# Patient Record
Sex: FEMALE | Race: WHITE | URBAN - METROPOLITAN AREA
[De-identification: names, ages, dates, MRNs, and addresses within clinical notes are randomized per-mention and may not be internally consistent; named-entity substitution may affect disease eponyms.]

---

## 2016-07-22 LAB
CREATININE, EXTERNAL: 0.9
LDL-C, EXTERNAL: 223

## 2021-06-01 ENCOUNTER — OFFICE VISIT (OUTPATIENT)
Dept: ENDOCRINOLOGY | Age: 81
End: 2021-06-01
Payer: MEDICARE

## 2021-06-01 VITALS
OXYGEN SATURATION: 94 % | DIASTOLIC BLOOD PRESSURE: 71 MMHG | WEIGHT: 189.8 LBS | RESPIRATION RATE: 18 BRPM | HEIGHT: 67 IN | SYSTOLIC BLOOD PRESSURE: 115 MMHG | HEART RATE: 100 BPM | TEMPERATURE: 96.5 F | BODY MASS INDEX: 29.79 KG/M2

## 2021-06-01 DIAGNOSIS — E83.52 HYPERCALCEMIA: Primary | ICD-10-CM

## 2021-06-01 LAB
25(OH)D3 SERPL-MCNC: 23.6 NG/ML (ref 30–100)
ALBUMIN SERPL-MCNC: 4 G/DL (ref 3.5–5)
ALBUMIN/GLOB SERPL: 1.4 {RATIO} (ref 1.1–2.2)
ALP SERPL-CCNC: 109 U/L (ref 45–117)
ALT SERPL-CCNC: 44 U/L (ref 12–78)
ANION GAP SERPL CALC-SCNC: 8 MMOL/L (ref 5–15)
AST SERPL-CCNC: 32 U/L (ref 15–37)
BILIRUB SERPL-MCNC: 0.5 MG/DL (ref 0.2–1)
BUN SERPL-MCNC: 23 MG/DL (ref 6–20)
BUN/CREAT SERPL: 26 (ref 12–20)
CALCIUM SERPL-MCNC: 9.6 MG/DL (ref 8.5–10.1)
CALCIUM SERPL-MCNC: 9.8 MG/DL (ref 8.5–10.1)
CHLORIDE SERPL-SCNC: 105 MMOL/L (ref 97–108)
CO2 SERPL-SCNC: 27 MMOL/L (ref 21–32)
CREAT SERPL-MCNC: 0.89 MG/DL (ref 0.55–1.02)
GLOBULIN SER CALC-MCNC: 2.8 G/DL (ref 2–4)
GLUCOSE SERPL-MCNC: 102 MG/DL (ref 65–100)
POTASSIUM SERPL-SCNC: 3.8 MMOL/L (ref 3.5–5.1)
PROT SERPL-MCNC: 6.8 G/DL (ref 6.4–8.2)
PTH-INTACT SERPL-MCNC: 68.8 PG/ML (ref 18.4–88)
SODIUM SERPL-SCNC: 140 MMOL/L (ref 136–145)

## 2021-06-01 PROCEDURE — G8536 NO DOC ELDER MAL SCRN: HCPCS | Performed by: INTERNAL MEDICINE

## 2021-06-01 PROCEDURE — 1090F PRES/ABSN URINE INCON ASSESS: CPT | Performed by: INTERNAL MEDICINE

## 2021-06-01 PROCEDURE — G8427 DOCREV CUR MEDS BY ELIG CLIN: HCPCS | Performed by: INTERNAL MEDICINE

## 2021-06-01 PROCEDURE — 1101F PT FALLS ASSESS-DOCD LE1/YR: CPT | Performed by: INTERNAL MEDICINE

## 2021-06-01 PROCEDURE — G9717 DOC PT DX DEP/BP F/U NT REQ: HCPCS | Performed by: INTERNAL MEDICINE

## 2021-06-01 PROCEDURE — G8419 CALC BMI OUT NRM PARAM NOF/U: HCPCS | Performed by: INTERNAL MEDICINE

## 2021-06-01 PROCEDURE — G8752 SYS BP LESS 140: HCPCS | Performed by: INTERNAL MEDICINE

## 2021-06-01 PROCEDURE — G8754 DIAS BP LESS 90: HCPCS | Performed by: INTERNAL MEDICINE

## 2021-06-01 PROCEDURE — 99204 OFFICE O/P NEW MOD 45 MIN: CPT | Performed by: INTERNAL MEDICINE

## 2021-06-01 RX ORDER — LANOLIN ALCOHOL/MO/W.PET/CERES
CREAM (GRAM) TOPICAL
Qty: 60 TABLET | Refills: 0
Start: 2021-06-01

## 2021-06-01 NOTE — PATIENT INSTRUCTIONS
SPECIFIC INSTRUCTIONS BELOW 24 hour urine collection instructions for calcium and creat and sodium On the day you plan to collect urine 1. Discard first urine sample soon after you get up 2. Start collecting urine samples in the container then on whole first day . Gerldine Prost ... 3. until the first sample next day is collected into the jar. PAY ATTENTION TO THESE GENERAL INSTRUCTIONS  
 
-ANY tests other than blood work, which you opt to do  outside the  [facility] facilities, you are responsible for prior authorizations if  required 
 
- [address] [facility] AVS- PLEASE IGNORE  
- YOUR MED LIST IS NOT UP TO DATE AS SOME CHANGES ARE BEING MADE AFTER THE VISIT - [address] [place] [address] Results *Normal results will not be notified by a phone call starting January 1 2021 *If you have an upcoming visit, the results will be discussed at the visit *Please sign up for MY CHART if you want access to your lab and test results *Abnormal results which require immediate attention will be notified by phone call *Abnormal results which do not require immediate assistance will be notified in 1-2 weeks Refills    -    have your pharmacy send us a refill request 
Phone calls  -  Allow  24 hrs. for non-urgent calls to be returned Prior authorization - It may take 2-4 weeks to process Forms  -  FMLA, DMV etc., will take up to 2 weeks to process Cancellations - please notify the office 2 days in advance Samples  - will only be dispensed at visits  
 
--------------------------------------------------------------------------------------------

## 2021-06-01 NOTE — LETTER
6/7/2021 Patient: Norvin Gitelman YOB: 1940 Date of Visit: 6/1/2021 Sarina Rizo MD 
[address] [place] [id number] Via Fax: [phone] Dear Sarina Rizo MD, Thank you for referring Ms. Vale Walker to [address] [place] [address] for evaluation. My notes for this consultation are attached. If you have questions, please do not hesitate to call me. I look forward to following your patient along with you. Sincerely, Sebastian Concepcion MD

## 2021-06-01 NOTE — PROGRESS NOTES
Room 1    Identified pt with two pt identifiers(name and ). Reviewed record in preparation for visit and have obtained necessary documentation. All patient medications has been reviewed. Chief Complaint   Patient presents with   [facility] Establish Care    Elevated Blood Calcium       3 most recent PHQ Screens 2021   Little interest or pleasure in doing things Not at all   Feeling down, depressed, irritable, or hopeless Not at all   Total Score PHQ 2 0     Abuse Screening Questionnaire 2021   Do you ever feel afraid of your partner? N   Are you in a relationship with someone who physically or mentally threatens you? N   Is it safe for you to go home? Y       Health Maintenance Review: Patient reminded of \"due or due soon\" health maintenance. I have asked the patient to contact his/her primary care provider (PCP) for follow-up on his/her health maintenance. Vitals:    21 0937   BP: 115/71   Pulse: 100   Resp: 18   Temp: (!) 96.5 °F (35.8 °C)   TempSrc: Oral   SpO2: 94%   Weight: 189 lb 12.8 oz (86.1 kg)   Height: 5' 7.25\" (1.708 m)   PainSc:   0 - No pain         No results found for: HBA1C, UKB0ZYUD, IHG7HSLU, VBN6KNTX    Coordination of Care Questionnaire:   1) Have you been to an emergency room, urgent care, or hospitalized since your last visit?   no       2. Have seen or consulted any other health care provider since your last visit? NO      Patient is accompanied by self I have received verbal consent from Tyson Donahue to discuss any/all medical information while they are present in the room.

## 2021-06-01 NOTE — PROGRESS NOTES
HISTORY OF PRESENT ILLNESS  Norvin Gitelman is a 80 y.o. female. HPI  Initial visit for  High calcium level   Calcium was 11.6  From 2021   She stopped all the calcium and vit  D meds     She has no other specific symptoms   She has fatigue           Past Medical History:   Diagnosis Date    Depression     HTN (hypertension)     Hyperlipidemia     Osteoporosis        Social History     Socioeconomic History    Marital status: SINGLE     Spouse name: Not on file    Number of children: Not on file    Years of education: Not on file    Highest education level: Not on file   Occupational History    Not on file   Tobacco Use    Smoking status: Former Smoker     Packs/day: 1.00     Quit date: 1973     Years since quittin.4    Smokeless tobacco: Never Used   Vaping Use    Vaping Use: Never used   Substance and Sexual Activity    Alcohol use: No    Drug use: Not on file    Sexual activity: Not on file   Other Topics Concern    Not on file   Social History Narrative    Not on file     Social Determinants of Health     Financial Resource Strain:     Difficulty of Paying Living Expenses:    Food Insecurity:     Worried About Running Out of Food in the Last Year:     Ran Out of Food in the Last Year:    Transportation Needs:     Lack of Transportation (Medical):      Lack of Transportation (Non-Medical):    Physical Activity:     Days of Exercise per Week:     Minutes of Exercise per Session:    Stress:     Feeling of Stress :    Social Connections:     Frequency of Communication with Friends and Family:     Frequency of Social Gatherings with Friends and Family:     Attends Mor[employer] Services:     Active Member of Clubs or Organizations:     Attends Club or Organization Meetings:     Marital Status:    Intimate Partner Violence:     Fear of Current or Ex-Partner:     Emotionally Abused:     Physically Abused:     Sexually Abused:        Family History   Problem Relation Age of Onset    Cancer Mother         lung    Alcohol abuse Father     Hypertension Sister        Review of Systems   Constitutional: Positive for malaise/fatigue and weight loss. HENT: Negative. Eyes: Negative for pain and redness. Respiratory: Negative. Cardiovascular: Negative for chest pain, palpitations and leg swelling. Gastrointestinal: Negative. Negative for constipation. Genitourinary: Negative. Musculoskeletal: Negative for myalgias. Skin: Negative. Neurological: Negative. Endo/Heme/Allergies: Negative. Psychiatric/Behavioral: Negative for depression and memory loss. The patient does not have insomnia. Physical Exam  Constitutional:       Appearance: She is well-developed. HENT:      Head: Normocephalic. Eyes:      Conjunctiva/sclera: Conjunctivae normal.      Pupils: Pupils are equal, round, and reactive to light. Neck:      Thyroid: No thyromegaly. Vascular: No JVD. Trachea: No tracheal deviation. Cardiovascular:      Rate and Rhythm: Normal rate and regular rhythm. Heart sounds: Normal heart sounds. No murmur heard. Pulmonary:      Breath sounds: Normal breath sounds. Abdominal:      General: Bowel sounds are normal.      Palpations: Abdomen is soft. Musculoskeletal:         General: Normal range of motion. Cervical back: Normal range of motion and neck supple. Lymphadenopathy:      Cervical: No cervical adenopathy. Skin:     General: Skin is warm. Neurological:      Mental Status: She is alert and oriented to person, place, and time. Deep Tendon Reflexes: Reflexes are normal and symmetric. Reviewed labs from PCP     ASSESSMENT and PLAN    1.  Hypercalcemia :   Patient has moderate range of hypercalcemia     Likely  primary hyperparathyroidism   Will order repeat labs   Will order parathyroid scan, 24 hr urine for calcium, sodium , creatinine  and bone DEXA     As  most patients do not require aggressive intervention unless calcium levels are high,  We opt for watchful observation  In some patients who get localized with parathyroid adenoma and if they satisfy surgical criterion we recommend surgical intervention  Patients can continue on taking low dose calcium supplements despite the hypecalcemia       Reviewed results with patient and discussed the labs being ordered today/bnv  Patient voiced understanding of plan of care

## 2021-06-07 NOTE — PROGRESS NOTES
Inform patient that her calcium levels are back down to normal range. She could be having parathyroid gland abnormality , but subtle .  She can proceed with other testing

## 2021-06-23 DIAGNOSIS — E83.52 HYPERCALCEMIA: ICD-10-CM

## 2021-06-23 LAB
CALCIUM 24H UR-MRATE: 255 MG/24 HR (ref 142–353)
COLLECT DURATION TIME UR: 24 HR
CREAT 24H UR-MRATE: 1302 MG/24HR (ref 600–1800)
SODIUM 24H UR-SRATE: 109 MMOL/24HR (ref 40–220)
SPECIMEN VOL 24H UR: 1450 ML
SPECIMEN VOL 24H UR: 1450 ML
SPECIMEN VOL ?TM UR: 1450 ML

## 2021-08-03 PROBLEM — I10 HTN (HYPERTENSION): Status: RESOLVED | Noted: 2021-08-03 | Resolved: 2021-08-03

## 2021-11-29 ENCOUNTER — OFFICE VISIT (OUTPATIENT)
Dept: OBGYN CLINIC | Age: 81
End: 2021-11-29
Payer: MEDICARE

## 2021-11-29 VITALS
HEIGHT: 66 IN | WEIGHT: 187 LBS | DIASTOLIC BLOOD PRESSURE: 80 MMHG | BODY MASS INDEX: 30.05 KG/M2 | SYSTOLIC BLOOD PRESSURE: 137 MMHG

## 2021-11-29 DIAGNOSIS — Z01.419 ROUTINE GYNECOLOGICAL EXAMINATION: Primary | ICD-10-CM

## 2021-11-29 PROCEDURE — 1101F PT FALLS ASSESS-DOCD LE1/YR: CPT | Performed by: OBSTETRICS & GYNECOLOGY

## 2021-11-29 PROCEDURE — G0101 CA SCREEN;PELVIC/BREAST EXAM: HCPCS | Performed by: OBSTETRICS & GYNECOLOGY

## 2021-11-29 PROCEDURE — G9717 DOC PT DX DEP/BP F/U NT REQ: HCPCS | Performed by: OBSTETRICS & GYNECOLOGY

## 2021-11-29 PROCEDURE — G8417 CALC BMI ABV UP PARAM F/U: HCPCS | Performed by: OBSTETRICS & GYNECOLOGY

## 2021-11-29 PROCEDURE — G8754 DIAS BP LESS 90: HCPCS | Performed by: OBSTETRICS & GYNECOLOGY

## 2021-11-29 PROCEDURE — 1090F PRES/ABSN URINE INCON ASSESS: CPT | Performed by: OBSTETRICS & GYNECOLOGY

## 2021-11-29 PROCEDURE — G8752 SYS BP LESS 140: HCPCS | Performed by: OBSTETRICS & GYNECOLOGY

## 2021-11-29 NOTE — PROGRESS NOTES
HISTORY OF PRESENT ILLNESS  Angela Oneill is a 80 y.o. female who presents today for the following:  Chief Complaint   Patient presents with    Annual Exam   Patient is an 57-year-old female who presents for routine annual exam.  She is without complaints on presentation today. No obstetric history on file. No Known Allergies    Current Outpatient Medications   Medication Sig    calcium citrate-vitamin d3 (Citracal plus D) 315 mg-5 mcg (200 unit) tab Patient held it back for hypercalcemia    fluvoxaMINE (LUVOX) 100 mg tablet Take 100 mg by mouth.  aspirin delayed-release 81 mg tablet Take  by mouth daily.  docusate sodium (STOOL SOFTENER) 100 mg capsule Take 100 mg by mouth daily. (Patient not taking: Reported on 2021)    MULTIVITS-MIN/IRON/FA/LUTEIN (CENTRAL-DAVID WOMEN'S MATURE PO) Take 1 Tab by mouth daily.  hydrochlorothiazide (HYDRODIURIL) 25 mg tablet Take 25 mg by mouth daily.  QUEtiapine (SEROQUEL) 100 mg tablet Take 300 mg by mouth nightly.  losartan (COZAAR) 50 mg tablet Take 40 mg by mouth daily. No current facility-administered medications for this visit.        Past Medical History:   Diagnosis Date    Depression     HTN (hypertension)     Hyperlipidemia     Osteoporosis        Past Surgical History:   Procedure Laterality Date    HX BLADDER REPAIR      bladder tuck    HX HYSTERECTOMY      HX OTHER SURGICAL      right wrist    HX TONSILLECTOMY         Family History   Problem Relation Age of Onset    Cancer Mother         lung    Alcohol abuse Father     Hypertension Sister        Social History     Socioeconomic History    Marital status: SINGLE     Spouse name: Not on file    Number of children: Not on file    Years of education: Not on file    Highest education level: Not on file   Occupational History    Not on file   Tobacco Use    Smoking status: Former Smoker     Packs/day: 1.00     Quit date: 1973     Years since quittin.9    Smokeless tobacco: Never Used   Vaping Use    Vaping Use: Never used   Substance and Sexual Activity    Alcohol use: No    Drug use: Never    Sexual activity: Not Currently   Other Topics Concern    Not on file   Social History Narrative    Not on file     Social Determinants of Health     Financial Resource Strain:     Difficulty of Paying Living Expenses: Not on file   Food Insecurity:     Worried About Running Out of Food in the Last Year: Not on file    Lucio of Food in the Last Year: Not on file   Transportation Needs:     Lack of Transportation (Medical): Not on file    Lack of Transportation (Non-Medical): Not on file   Physical Activity:     Days of Exercise per Week: Not on file    Minutes of Exercise per Session: Not on file   Stress:     Feeling of Stress : Not on file   Social Connections:     Frequency of Communication with Friends and Family: Not on file    Frequency of Social Gatherings with Friends and Family: Not on file    Attends Scientology Services: Not on file    Active Member of [address] [place] [address] or Organizations: Not on file    Attends Club or Organization Meetings: Not on file    Marital Status: Not on file   Intimate Partner Violence:     Fear of Current or Ex-Partner: Not on file    Emotionally Abused: Not on file    Physically Abused: Not on file    Sexually Abused: Not on file   Housing Stability:     Unable to Pay for Housing in the Last Year: Not on file    Number of [place] in the Last Year: Not on file    Unstable Housing in the Last Year: Not on file           REVIEW OF SYSTEMS     Constitutional: Negative for chills, fever and malaise/fatigue. HENT: Negative for congestion, hearing loss and sore throat. Respiratory: Negative for cough, sputum production, shortness of breath and wheezing. Cardiovascular: Negative for chest pain. Gastrointestinal: Negative for abdominal pain, constipation, diarrhea, nausea and vomiting.    Genitourinary: Negative for dysuria, flank pain, hematuria and urgency. Neurological: Negative for dizziness, loss of consciousness, weakness and headaches. Psychiatric/Behavioral: Negative for depression. PHYSICAL EXAM  /80 (BP 1 Location: Right upper arm, BP Patient Position: Sitting, BP Cuff Size: Small adult)   Ht 5' 6\" (1.676 m)   Wt 187 lb (84.8 kg)   BMI 30.18 kg/m²      Patient is a well-developed well-nourished female no apparent distress  She is alert and oriented x3  Head is normocephalic atraumatic pupils equal round react light accommodation  Neck is supple without adenopathy or thyromegaly  Heart is with regular rate and rhythm without murmurs rubs or gallops  Lungs are clear to auscultation and percussion bilaterally  Breasts are without masses bilaterally  Abdomen is soft nontender nondistended bowel sounds are present and active  Extremities are without clubbing cyanosis or edema  Pulses are full and symmetric bilaterally  Pelvic  External genitalia within normal limits  Urethra is midline there are no apparent urethral lesions the bladder is within normal limits  Vagina is with normal rugae there is minimal discharge present in the vaginal vault  Cervix is surgically absent  Uterus is surgically absent  Adnexa are without masses    ASSESSMENT and PLAN  Normal annual exam  Plan: Mammogram ordered, follow-up as needed and yearly  No results found for this visit on 11/29/21. No orders of the defined types were placed in this encounter.

## 2022-10-14 ENCOUNTER — OFFICE VISIT (OUTPATIENT)
Dept: OBGYN CLINIC | Age: 82
End: 2022-10-14
Payer: MEDICARE

## 2022-10-14 VITALS — SYSTOLIC BLOOD PRESSURE: 116 MMHG | DIASTOLIC BLOOD PRESSURE: 76 MMHG | BODY MASS INDEX: 30.67 KG/M2 | WEIGHT: 190 LBS

## 2022-10-14 DIAGNOSIS — N76.0 VAGINITIS AND VULVOVAGINITIS: Primary | ICD-10-CM

## 2022-10-14 DIAGNOSIS — B37.31 MONILIAL VULVOVAGINITIS: ICD-10-CM

## 2022-10-14 PROCEDURE — G8417 CALC BMI ABV UP PARAM F/U: HCPCS | Performed by: OBSTETRICS & GYNECOLOGY

## 2022-10-14 PROCEDURE — 1090F PRES/ABSN URINE INCON ASSESS: CPT | Performed by: OBSTETRICS & GYNECOLOGY

## 2022-10-14 PROCEDURE — G8536 NO DOC ELDER MAL SCRN: HCPCS | Performed by: OBSTETRICS & GYNECOLOGY

## 2022-10-14 PROCEDURE — 99212 OFFICE O/P EST SF 10 MIN: CPT | Performed by: OBSTETRICS & GYNECOLOGY

## 2022-10-14 PROCEDURE — G9717 DOC PT DX DEP/BP F/U NT REQ: HCPCS | Performed by: OBSTETRICS & GYNECOLOGY

## 2022-10-14 PROCEDURE — G8427 DOCREV CUR MEDS BY ELIG CLIN: HCPCS | Performed by: OBSTETRICS & GYNECOLOGY

## 2022-10-14 PROCEDURE — 1101F PT FALLS ASSESS-DOCD LE1/YR: CPT | Performed by: OBSTETRICS & GYNECOLOGY

## 2022-10-14 PROCEDURE — G8754 DIAS BP LESS 90: HCPCS | Performed by: OBSTETRICS & GYNECOLOGY

## 2022-10-14 PROCEDURE — G8752 SYS BP LESS 140: HCPCS | Performed by: OBSTETRICS & GYNECOLOGY

## 2022-10-14 PROCEDURE — 1123F ACP DISCUSS/DSCN MKR DOCD: CPT | Performed by: OBSTETRICS & GYNECOLOGY

## 2022-10-14 RX ORDER — FLUCONAZOLE 150 MG/1
150 TABLET ORAL DAILY
Qty: 1 TABLET | Refills: 0 | Status: SHIPPED | OUTPATIENT
Start: 2022-10-14 | End: 2022-10-15

## 2022-10-14 NOTE — PROGRESS NOTES
Rhonda Weiner is a 80 y.o. female who presents today for the following:  Chief Complaint   Patient presents with    Vaginal Itching     Pt. States she has some vaginal burning and itching          HPI  Patient is an 51-year-old G3, P4 female who presents today with complaints of vaginal burning and irritation. OB History          3    Para   3    Term   3            AB        Living   3         SAB        IAB        Ectopic        Molar        Multiple        Live Births   3                   /76 (BP 1 Location: Right upper arm, BP Patient Position: Sitting, BP Cuff Size: Small adult)   Wt 190 lb (86.2 kg)   BMI 30.67 kg/m²    OBGyn Exam   Patient is a well-developed well-nourished female no apparent distress  She is alert and oriented x3  Pelvic  External genitalia within normal limits  Urethra is midline there are no apparent urethral lesions the bladder is within normal limits  Vagina is with normal rugae though atrophic, there is some white discharge present in the vaginal vault  No results found for this visit on 10/14/22. Assessment:  Probable yeast vaginitis  Plan: Fluconazole Rx sent, follow-up as needed and yearly    No orders of the defined types were placed in this encounter.

## 2022-10-16 LAB
A VAGINAE DNA VAG QL NAA+PROBE: NORMAL SCORE
BVAB2 DNA VAG QL NAA+PROBE: NORMAL SCORE
C ALBICANS DNA VAG QL NAA+PROBE: NEGATIVE
C GLABRATA DNA VAG QL NAA+PROBE: NEGATIVE
C KRUSEI DNA VAG QL NAA+PROBE: NEGATIVE
C LUSITANIAE DNA VAG QL NAA+PROBE: NEGATIVE
C TRACH DNA VAG QL NAA+PROBE: NEGATIVE
CANDIDA DNA VAG QL NAA+PROBE: NEGATIVE
MEGA1 DNA VAG QL NAA+PROBE: NORMAL SCORE
N GONORRHOEA DNA VAG QL NAA+PROBE: NEGATIVE
T VAGINALIS DNA VAG QL NAA+PROBE: NEGATIVE

## 2023-03-01 ENCOUNTER — TRANSCRIBE ORDER (OUTPATIENT)
Dept: SCHEDULING | Age: 83
End: 2023-03-01

## 2023-03-01 DIAGNOSIS — S79.921A INJURY OF RIGHT THIGH, INITIAL ENCOUNTER: Primary | ICD-10-CM

## 2023-03-13 ENCOUNTER — HOSPITAL ENCOUNTER (OUTPATIENT)
Dept: MRI IMAGING | Age: 83
Discharge: HOME | End: 2023-03-13

## 2023-03-13 ENCOUNTER — TRANSCRIBE ORDER (OUTPATIENT)
Dept: SCHEDULING | Age: 83
End: 2023-03-13

## 2023-03-13 DIAGNOSIS — S69.91XA INJURY OF RIGHT THUMB, INITIAL ENCOUNTER: Primary | ICD-10-CM

## 2023-03-13 DIAGNOSIS — S69.91XA INJURY OF RIGHT THUMB, INITIAL ENCOUNTER: ICD-10-CM

## 2023-03-31 ENCOUNTER — HOSPITAL ENCOUNTER (OUTPATIENT)
Dept: MRI IMAGING | Age: 83
Discharge: HOME | End: 2023-03-31
Payer: MEDICARE

## 2023-03-31 PROCEDURE — 73218 MRI UPPER EXTREMITY W/O DYE: CPT

## 2023-04-22 DIAGNOSIS — S79.921A INJURY OF RIGHT THIGH, INITIAL ENCOUNTER: Primary | ICD-10-CM

## 2023-06-06 ENCOUNTER — APPOINTMENT (OUTPATIENT)
Facility: HOSPITAL | Age: 83
DRG: 195 | End: 2023-06-06
Payer: MEDICARE

## 2023-06-06 ENCOUNTER — HOSPITAL ENCOUNTER (INPATIENT)
Facility: HOSPITAL | Age: 83
LOS: 2 days | Discharge: HOME | DRG: 195 | End: 2023-06-08
Attending: EMERGENCY MEDICINE | Admitting: INTERNAL MEDICINE
Payer: MEDICARE

## 2023-06-06 DIAGNOSIS — R07.9 ACUTE CHEST PAIN: ICD-10-CM

## 2023-06-06 DIAGNOSIS — R06.09 DOE (DYSPNEA ON EXERTION): Primary | ICD-10-CM

## 2023-06-06 LAB
ALBUMIN SERPL-MCNC: 3.6 G/DL (ref 3.5–5)
ALBUMIN/GLOB SERPL: 1 (ref 1.1–2.2)
ALP SERPL-CCNC: 89 U/L (ref 45–117)
ALT SERPL-CCNC: 36 U/L (ref 12–78)
ANION GAP SERPL CALC-SCNC: 6 MMOL/L (ref 5–15)
AST SERPL W P-5'-P-CCNC: 26 U/L (ref 15–37)
BASOPHILS # BLD: 0.1 K/UL (ref 0–0.1)
BASOPHILS NFR BLD: 1 % (ref 0–1)
BILIRUB SERPL-MCNC: 0.5 MG/DL (ref 0.2–1)
BNP SERPL-MCNC: 165 PG/ML
BUN SERPL-MCNC: 21 MG/DL (ref 6–20)
BUN/CREAT SERPL: 23 (ref 12–20)
CA-I BLD-MCNC: 9.7 MG/DL (ref 8.5–10.1)
CHLORIDE SERPL-SCNC: 103 MMOL/L (ref 97–108)
CO2 SERPL-SCNC: 30 MMOL/L (ref 21–32)
CREAT SERPL-MCNC: 0.92 MG/DL (ref 0.55–1.02)
DIFFERENTIAL METHOD BLD: NORMAL
EKG ATRIAL RATE: 68 BPM
EKG DIAGNOSIS: NORMAL
EKG P AXIS: 3 DEGREES
EKG P-R INTERVAL: 182 MS
EKG Q-T INTERVAL: 450 MS
EKG QRS DURATION: 158 MS
EKG QTC CALCULATION (BAZETT): 478 MS
EKG R AXIS: -45 DEGREES
EKG T AXIS: 26 DEGREES
EKG VENTRICULAR RATE: 68 BPM
EOSINOPHIL # BLD: 0.1 K/UL (ref 0–0.4)
EOSINOPHIL NFR BLD: 2 % (ref 0–7)
ERYTHROCYTE [DISTWIDTH] IN BLOOD BY AUTOMATED COUNT: 13 % (ref 11.5–14.5)
GLOBULIN SER CALC-MCNC: 3.5 G/DL (ref 2–4)
GLUCOSE SERPL-MCNC: 97 MG/DL (ref 65–100)
HCT VFR BLD AUTO: 39.6 % (ref 35–47)
HGB BLD-MCNC: 13 G/DL (ref 11.5–16)
IMM GRANULOCYTES # BLD AUTO: 0 K/UL (ref 0–0.04)
IMM GRANULOCYTES NFR BLD AUTO: 0 % (ref 0–0.5)
LYMPHOCYTES # BLD: 1.2 K/UL (ref 0.8–3.5)
LYMPHOCYTES NFR BLD: 20 % (ref 12–49)
MAGNESIUM SERPL-MCNC: 2.2 MG/DL (ref 1.6–2.4)
MCH RBC QN AUTO: 30.8 PG (ref 26–34)
MCHC RBC AUTO-ENTMCNC: 32.8 G/DL (ref 30–36.5)
MCV RBC AUTO: 93.8 FL (ref 80–99)
MONOCYTES # BLD: 0.5 K/UL (ref 0–1)
MONOCYTES NFR BLD: 8 % (ref 5–13)
NEUTS SEG # BLD: 4.1 K/UL (ref 1.8–8)
NEUTS SEG NFR BLD: 69 % (ref 32–75)
NRBC # BLD: 0 K/UL (ref 0–0.01)
NRBC BLD-RTO: 0 PER 100 WBC
PLATELET # BLD AUTO: 257 K/UL (ref 150–400)
PMV BLD AUTO: 9.9 FL (ref 8.9–12.9)
POTASSIUM SERPL-SCNC: 3.1 MMOL/L (ref 3.5–5.1)
PROT SERPL-MCNC: 7.1 G/DL (ref 6.4–8.2)
RBC # BLD AUTO: 4.22 M/UL (ref 3.8–5.2)
SODIUM SERPL-SCNC: 139 MMOL/L (ref 136–145)
TROPONIN I SERPL HS-MCNC: 13 NG/L (ref 0–51)
WBC # BLD AUTO: 6 K/UL (ref 3.6–11)

## 2023-06-06 PROCEDURE — 1100000000 HC RM PRIVATE

## 2023-06-06 PROCEDURE — 84484 ASSAY OF TROPONIN QUANT: CPT

## 2023-06-06 PROCEDURE — 99285 EMERGENCY DEPT VISIT HI MDM: CPT

## 2023-06-06 PROCEDURE — 36415 COLL VENOUS BLD VENIPUNCTURE: CPT

## 2023-06-06 PROCEDURE — 71045 X-RAY EXAM CHEST 1 VIEW: CPT

## 2023-06-06 PROCEDURE — 93005 ELECTROCARDIOGRAM TRACING: CPT | Performed by: EMERGENCY MEDICINE

## 2023-06-06 PROCEDURE — 80053 COMPREHEN METABOLIC PANEL: CPT

## 2023-06-06 PROCEDURE — 83880 ASSAY OF NATRIURETIC PEPTIDE: CPT

## 2023-06-06 PROCEDURE — 85025 COMPLETE CBC W/AUTO DIFF WBC: CPT

## 2023-06-06 PROCEDURE — 94762 N-INVAS EAR/PLS OXIMTRY CONT: CPT

## 2023-06-06 PROCEDURE — 83735 ASSAY OF MAGNESIUM: CPT

## 2023-06-06 RX ORDER — QUETIAPINE FUMARATE 300 MG/1
300 TABLET, FILM COATED ORAL NIGHTLY
COMMUNITY
Start: 2023-05-15

## 2023-06-06 ASSESSMENT — HEART SCORE: ECG: 1

## 2023-06-06 ASSESSMENT — PAIN - FUNCTIONAL ASSESSMENT: PAIN_FUNCTIONAL_ASSESSMENT: NONE - DENIES PAIN

## 2023-06-06 ASSESSMENT — LIFESTYLE VARIABLES
HOW MANY STANDARD DRINKS CONTAINING ALCOHOL DO YOU HAVE ON A TYPICAL DAY: PATIENT DOES NOT DRINK
HOW OFTEN DO YOU HAVE A DRINK CONTAINING ALCOHOL: NEVER

## 2023-06-06 NOTE — ED NOTES
Pt placed on continuous cardiac monitoring, pulse ox, and blood pressure monitoring at this time.         Chi Kasper RN  06/06/23 8147

## 2023-06-06 NOTE — ED PROVIDER NOTES
Metabolic Panel    Collection Time: 06/06/23  5:00 PM   Result Value Ref Range    Sodium 139 136 - 145 mmol/L    Potassium 3.1 (L) 3.5 - 5.1 mmol/L    Chloride 103 97 - 108 mmol/L    CO2 30 21 - 32 mmol/L    Anion Gap 6 5 - 15 mmol/L    Glucose 97 65 - 100 mg/dL    BUN 21 (H) 6 - 20 mg/dL    Creatinine 0.92 0.55 - 1.02 mg/dL    Bun/Cre Ratio 23 (H) 12 - 20      Est, Glom Filt Rate >60 >60 ml/min/1.73m2    Calcium 9.7 8.5 - 10.1 mg/dL    Total Bilirubin 0.5 0.2 - 1.0 mg/dL    AST 26 15 - 37 U/L    ALT 36 12 - 78 U/L    Alk Phosphatase 89 45 - 117 U/L    Total Protein 7.1 6.4 - 8.2 g/dL    Albumin 3.6 3.5 - 5.0 g/dL    Globulin 3.5 2.0 - 4.0 g/dL    Albumin/Globulin Ratio 1.0 (L) 1.1 - 2.2     Magnesium    Collection Time: 06/06/23  5:00 PM   Result Value Ref Range    Magnesium 2.2 1.6 - 2.4 mg/dL   Troponin    Collection Time: 06/06/23  5:00 PM   Result Value Ref Range    Troponin, High Sensitivity 13 0 - 51 ng/L   Brain Natriuretic Peptide    Collection Time: 06/06/23  5:00 PM   Result Value Ref Range    NT Pro- <450 pg/mL       EKG: Initial EKG interpreted by me. Normal Sinus Rhythm with a HR of 68. Normal UT, normal QRS, normal QTc. Bifascicular block. LVH. Jessica Zabrina No STEMI. Radiologic Studies:  Non-plain film images such as CT, Ultrasound and MRI are read by the radiologist. Plain radiographic images are visualized and preliminarily interpreted by the ED Provider with the following findings: Not Applicable. Interpretation per the Radiologist below, if available at the time of this note:  XR CHEST PORTABLE   Final Result      Possible retrocardiac airspace opacities in the left lung base which may   represent pneumonia.               EMERGENCY DEPARTMENT COURSE and DIFFERENTIAL DIAGNOSIS/MDM   CC/HPI Summary, DDx, ED Course, and Reassessment:     Clinical Management Tools:  Not Applicable    Records Reviewed (source and summary of external notes): Prior medical records and Nursing notes    Vitals:

## 2023-06-06 NOTE — ED TRIAGE NOTES
Pt arrives via EMS c/o SOB. She was at her PCP being seen for SOB that she has had for \"a while\" that worsens with exertion. Her PCP did an EKG and she had EKG changes that her PCP believed were indicative of MI. Pt denies CP upon arrival to ED. A&O x4, clear speech, follows commands, NAD.

## 2023-06-07 ENCOUNTER — APPOINTMENT (OUTPATIENT)
Facility: HOSPITAL | Age: 83
DRG: 195 | End: 2023-06-07
Attending: INTERNAL MEDICINE
Payer: MEDICARE

## 2023-06-07 PROBLEM — I49.9 CARDIAC ARRHYTHMIA: Status: ACTIVE | Noted: 2023-06-07

## 2023-06-07 LAB
ANION GAP SERPL CALC-SCNC: 5 MMOL/L (ref 5–15)
BUN SERPL-MCNC: 24 MG/DL (ref 6–20)
BUN/CREAT SERPL: 26 (ref 12–20)
CA-I BLD-MCNC: 9.3 MG/DL (ref 8.5–10.1)
CHLORIDE SERPL-SCNC: 103 MMOL/L (ref 97–108)
CO2 SERPL-SCNC: 30 MMOL/L (ref 21–32)
CREAT SERPL-MCNC: 0.92 MG/DL (ref 0.55–1.02)
ECHO AO ASC DIAM: 3.2 CM
ECHO AO ASCENDING AORTA INDEX: 1.68 CM/M2
ECHO AO ROOT DIAM: 3.2 CM
ECHO AO ROOT INDEX: 1.68 CM/M2
ECHO AV AREA PEAK VELOCITY: 2 CM2
ECHO AV AREA VTI: 2.2 CM2
ECHO AV AREA/BSA PEAK VELOCITY: 1 CM2/M2
ECHO AV AREA/BSA VTI: 1.2 CM2/M2
ECHO AV MEAN GRADIENT: 9 MMHG
ECHO AV MEAN VELOCITY: 1.4 M/S
ECHO AV PEAK GRADIENT: 16 MMHG
ECHO AV PEAK VELOCITY: 2 M/S
ECHO AV VELOCITY RATIO: 0.65
ECHO AV VTI: 42 CM
ECHO BSA: 1.94 M2
ECHO LA AREA 2C: 17.1 CM2
ECHO LA AREA 4C: 20.4 CM2
ECHO LA DIAMETER INDEX: 1.88 CM/M2
ECHO LA DIAMETER: 3.6 CM
ECHO LA MAJOR AXIS: 5.4 CM
ECHO LA MINOR AXIS: 5.3 CM
ECHO LA TO AORTIC ROOT RATIO: 1.13
ECHO LA VOL 2C: 45 ML (ref 22–52)
ECHO LA VOL 4C: 63 ML (ref 22–52)
ECHO LA VOL BP: 53 ML (ref 22–52)
ECHO LA VOL/BSA BIPLANE: 28 ML/M2 (ref 16–34)
ECHO LA VOLUME INDEX A2C: 24 ML/M2 (ref 16–34)
ECHO LA VOLUME INDEX A4C: 33 ML/M2 (ref 16–34)
ECHO LV E' LATERAL VELOCITY: 7 CM/S
ECHO LV E' SEPTAL VELOCITY: 5 CM/S
ECHO LV EDV A2C: 86 ML
ECHO LV EDV A4C: 98 ML
ECHO LV EDV INDEX A4C: 51 ML/M2
ECHO LV EDV NDEX A2C: 45 ML/M2
ECHO LV EJECTION FRACTION A2C: 61 %
ECHO LV EJECTION FRACTION A4C: 58 %
ECHO LV EJECTION FRACTION BIPLANE: 57 % (ref 55–100)
ECHO LV ESV A2C: 34 ML
ECHO LV ESV A4C: 41 ML
ECHO LV ESV INDEX A2C: 18 ML/M2
ECHO LV ESV INDEX A4C: 21 ML/M2
ECHO LV FRACTIONAL SHORTENING: 32 % (ref 28–44)
ECHO LV INTERNAL DIMENSION DIASTOLE INDEX: 2.15 CM/M2
ECHO LV INTERNAL DIMENSION DIASTOLIC: 4.1 CM (ref 3.9–5.3)
ECHO LV INTERNAL DIMENSION SYSTOLIC INDEX: 1.47 CM/M2
ECHO LV INTERNAL DIMENSION SYSTOLIC: 2.8 CM
ECHO LV IVSD: 1.5 CM (ref 0.6–0.9)
ECHO LV MASS 2D: 204.9 G (ref 67–162)
ECHO LV MASS INDEX 2D: 107.3 G/M2 (ref 43–95)
ECHO LV POSTERIOR WALL DIASTOLIC: 1.2 CM (ref 0.6–0.9)
ECHO LV RELATIVE WALL THICKNESS RATIO: 0.59
ECHO LVOT AREA: 3.1 CM2
ECHO LVOT AV VTI INDEX: 0.7
ECHO LVOT DIAM: 2 CM
ECHO LVOT MEAN GRADIENT: 4 MMHG
ECHO LVOT PEAK GRADIENT: 7 MMHG
ECHO LVOT PEAK VELOCITY: 1.3 M/S
ECHO LVOT STROKE VOLUME INDEX: 48 ML/M2
ECHO LVOT SV: 91.7 ML
ECHO LVOT VTI: 29.2 CM
ECHO MV A VELOCITY: 1.19 M/S
ECHO MV E DECELERATION TIME (DT): 331 MS
ECHO MV E VELOCITY: 0.61 M/S
ECHO MV E/A RATIO: 0.51
ECHO MV E/E' LATERAL: 8.71
ECHO MV E/E' RATIO (AVERAGED): 10.46
ECHO MV E/E' SEPTAL: 12.2
ECHO PV MAX VELOCITY: 0.7 M/S
ECHO PV PEAK GRADIENT: 2 MMHG
ECHO RA AREA 4C: 13.2 CM2
ECHO RA END SYSTOLIC VOLUME APICAL 4 CHAMBER INDEX BSA: 15 ML/M2
ECHO RA VOLUME: 28 ML
ECHO RV BASAL DIMENSION: 3.8 CM
ECHO RV TAPSE: 1.8 CM (ref 1.7–?)
ECHO TV REGURGITANT MAX VELOCITY: 1.85 M/S
ECHO TV REGURGITANT PEAK GRADIENT: 14 MMHG
EKG ATRIAL RATE: 63 BPM
EKG DIAGNOSIS: NORMAL
EKG P AXIS: 48 DEGREES
EKG P-R INTERVAL: 174 MS
EKG Q-T INTERVAL: 408 MS
EKG QRS DURATION: 74 MS
EKG QTC CALCULATION (BAZETT): 417 MS
EKG R AXIS: -31 DEGREES
EKG T AXIS: 18 DEGREES
EKG VENTRICULAR RATE: 63 BPM
GLUCOSE SERPL-MCNC: 126 MG/DL (ref 65–100)
POTASSIUM SERPL-SCNC: 3.3 MMOL/L (ref 3.5–5.1)
SODIUM SERPL-SCNC: 138 MMOL/L (ref 136–145)
TROPONIN I SERPL HS-MCNC: 11 NG/L (ref 0–51)
TROPONIN I SERPL HS-MCNC: 11 NG/L (ref 0–51)
TROPONIN I SERPL HS-MCNC: 13 NG/L (ref 0–51)
TROPONIN I SERPL HS-MCNC: 13 NG/L (ref 0–51)
TROPONIN I SERPL HS-MCNC: 14 NG/L (ref 0–51)
TROPONIN I SERPL HS-MCNC: 15 NG/L (ref 0–51)
TROPONIN I SERPL HS-MCNC: 18 NG/L (ref 0–51)

## 2023-06-07 PROCEDURE — 80048 BASIC METABOLIC PNL TOTAL CA: CPT

## 2023-06-07 PROCEDURE — 2580000003 HC RX 258: Performed by: INTERNAL MEDICINE

## 2023-06-07 PROCEDURE — 2060000000 HC ICU INTERMEDIATE R&B

## 2023-06-07 PROCEDURE — 6370000000 HC RX 637 (ALT 250 FOR IP): Performed by: INTERNAL MEDICINE

## 2023-06-07 PROCEDURE — 94761 N-INVAS EAR/PLS OXIMETRY MLT: CPT

## 2023-06-07 PROCEDURE — 2700000000 HC OXYGEN THERAPY PER DAY

## 2023-06-07 PROCEDURE — 93306 TTE W/DOPPLER COMPLETE: CPT

## 2023-06-07 PROCEDURE — 6360000002 HC RX W HCPCS: Performed by: INTERNAL MEDICINE

## 2023-06-07 PROCEDURE — 36415 COLL VENOUS BLD VENIPUNCTURE: CPT

## 2023-06-07 PROCEDURE — 84484 ASSAY OF TROPONIN QUANT: CPT

## 2023-06-07 RX ORDER — POTASSIUM CHLORIDE 7.45 MG/ML
10 INJECTION INTRAVENOUS PRN
Status: DISCONTINUED | OUTPATIENT
Start: 2023-06-07 | End: 2023-06-08 | Stop reason: HOSPADM

## 2023-06-07 RX ORDER — ACETAMINOPHEN 650 MG/1
650 SUPPOSITORY RECTAL EVERY 6 HOURS PRN
Status: DISCONTINUED | OUTPATIENT
Start: 2023-06-07 | End: 2023-06-08 | Stop reason: HOSPADM

## 2023-06-07 RX ORDER — ONDANSETRON 4 MG/1
4 TABLET, ORALLY DISINTEGRATING ORAL EVERY 8 HOURS PRN
Status: DISCONTINUED | OUTPATIENT
Start: 2023-06-07 | End: 2023-06-08 | Stop reason: HOSPADM

## 2023-06-07 RX ORDER — METOPROLOL TARTRATE 50 MG/1
50 TABLET, FILM COATED ORAL 2 TIMES DAILY
Status: DISCONTINUED | OUTPATIENT
Start: 2023-06-07 | End: 2023-06-08 | Stop reason: HOSPADM

## 2023-06-07 RX ORDER — ASPIRIN 81 MG/1
81 TABLET, CHEWABLE ORAL DAILY
Status: DISCONTINUED | OUTPATIENT
Start: 2023-06-07 | End: 2023-06-07

## 2023-06-07 RX ORDER — QUETIAPINE FUMARATE 100 MG/1
300 TABLET, FILM COATED ORAL NIGHTLY
Status: DISCONTINUED | OUTPATIENT
Start: 2023-06-07 | End: 2023-06-08 | Stop reason: HOSPADM

## 2023-06-07 RX ORDER — ASPIRIN 81 MG/1
81 TABLET, CHEWABLE ORAL DAILY
Status: DISCONTINUED | OUTPATIENT
Start: 2023-06-07 | End: 2023-06-08 | Stop reason: HOSPADM

## 2023-06-07 RX ORDER — ENOXAPARIN SODIUM 100 MG/ML
40 INJECTION SUBCUTANEOUS DAILY
Status: DISCONTINUED | OUTPATIENT
Start: 2023-06-07 | End: 2023-06-08 | Stop reason: HOSPADM

## 2023-06-07 RX ORDER — SODIUM CHLORIDE 0.9 % (FLUSH) 0.9 %
5-40 SYRINGE (ML) INJECTION EVERY 12 HOURS SCHEDULED
Status: DISCONTINUED | OUTPATIENT
Start: 2023-06-07 | End: 2023-06-08 | Stop reason: HOSPADM

## 2023-06-07 RX ORDER — ACETAMINOPHEN 325 MG/1
650 TABLET ORAL EVERY 6 HOURS PRN
Status: DISCONTINUED | OUTPATIENT
Start: 2023-06-07 | End: 2023-06-08 | Stop reason: HOSPADM

## 2023-06-07 RX ORDER — FLUVOXAMINE MALEATE 50 MG/1
100 TABLET, COATED ORAL NIGHTLY
Status: DISCONTINUED | OUTPATIENT
Start: 2023-06-07 | End: 2023-06-08 | Stop reason: HOSPADM

## 2023-06-07 RX ORDER — POTASSIUM CHLORIDE 20 MEQ/1
40 TABLET, EXTENDED RELEASE ORAL PRN
Status: DISCONTINUED | OUTPATIENT
Start: 2023-06-07 | End: 2023-06-08 | Stop reason: HOSPADM

## 2023-06-07 RX ORDER — HYDROCHLOROTHIAZIDE 25 MG/1
25 TABLET ORAL DAILY
Status: DISCONTINUED | OUTPATIENT
Start: 2023-06-07 | End: 2023-06-08 | Stop reason: HOSPADM

## 2023-06-07 RX ORDER — LOSARTAN POTASSIUM 50 MG/1
50 TABLET ORAL DAILY
Status: DISCONTINUED | OUTPATIENT
Start: 2023-06-07 | End: 2023-06-08 | Stop reason: HOSPADM

## 2023-06-07 RX ORDER — SODIUM CHLORIDE 0.9 % (FLUSH) 0.9 %
5-40 SYRINGE (ML) INJECTION PRN
Status: DISCONTINUED | OUTPATIENT
Start: 2023-06-07 | End: 2023-06-08 | Stop reason: HOSPADM

## 2023-06-07 RX ORDER — SODIUM CHLORIDE 9 MG/ML
INJECTION, SOLUTION INTRAVENOUS PRN
Status: DISCONTINUED | OUTPATIENT
Start: 2023-06-07 | End: 2023-06-08 | Stop reason: HOSPADM

## 2023-06-07 RX ORDER — POLYETHYLENE GLYCOL 3350 17 G/17G
17 POWDER, FOR SOLUTION ORAL DAILY PRN
Status: DISCONTINUED | OUTPATIENT
Start: 2023-06-07 | End: 2023-06-08 | Stop reason: HOSPADM

## 2023-06-07 RX ORDER — ONDANSETRON 2 MG/ML
4 INJECTION INTRAMUSCULAR; INTRAVENOUS EVERY 6 HOURS PRN
Status: DISCONTINUED | OUTPATIENT
Start: 2023-06-07 | End: 2023-06-08 | Stop reason: HOSPADM

## 2023-06-07 RX ORDER — POTASSIUM CHLORIDE 1.5 G/1.77G
40 POWDER, FOR SOLUTION ORAL PRN
Status: DISCONTINUED | OUTPATIENT
Start: 2023-06-07 | End: 2023-06-08 | Stop reason: HOSPADM

## 2023-06-07 RX ADMIN — SODIUM CHLORIDE, PRESERVATIVE FREE 10 ML: 5 INJECTION INTRAVENOUS at 09:10

## 2023-06-07 RX ADMIN — HYDROCHLOROTHIAZIDE 25 MG: 25 TABLET ORAL at 09:11

## 2023-06-07 RX ADMIN — FLUVOXAMINE MALEATE 100 MG: 50 TABLET, COATED ORAL at 01:44

## 2023-06-07 RX ADMIN — QUETIAPINE FUMARATE 300 MG: 300 TABLET ORAL at 01:44

## 2023-06-07 RX ADMIN — CEFTRIAXONE SODIUM 1000 MG: 1 INJECTION, POWDER, FOR SOLUTION INTRAMUSCULAR; INTRAVENOUS at 09:08

## 2023-06-07 RX ADMIN — AZITHROMYCIN MONOHYDRATE 500 MG: 500 INJECTION, POWDER, LYOPHILIZED, FOR SOLUTION INTRAVENOUS at 09:10

## 2023-06-07 RX ADMIN — ENOXAPARIN SODIUM 40 MG: 100 INJECTION SUBCUTANEOUS at 09:08

## 2023-06-07 RX ADMIN — METOPROLOL TARTRATE 50 MG: 50 TABLET, FILM COATED ORAL at 21:17

## 2023-06-07 RX ADMIN — LOSARTAN POTASSIUM 50 MG: 50 TABLET, FILM COATED ORAL at 00:25

## 2023-06-07 RX ADMIN — FLUVOXAMINE MALEATE 100 MG: 50 TABLET, COATED ORAL at 21:16

## 2023-06-07 RX ADMIN — QUETIAPINE FUMARATE 300 MG: 300 TABLET ORAL at 21:17

## 2023-06-07 RX ADMIN — ASPIRIN 81 MG 81 MG: 81 TABLET ORAL at 09:12

## 2023-06-07 RX ADMIN — METOPROLOL TARTRATE 50 MG: 50 TABLET, FILM COATED ORAL at 00:25

## 2023-06-07 RX ADMIN — SODIUM CHLORIDE, PRESERVATIVE FREE 10 ML: 5 INJECTION INTRAVENOUS at 21:18

## 2023-06-07 NOTE — PLAN OF CARE
Problem: Discharge Planning  Goal: Discharge to home or other facility with appropriate resources  6/7/2023 1227 by Liane Vang RN  Outcome: Progressing  Flowsheets (Taken 6/7/2023 0446 by Juhi Sales RN)  Discharge to home or other facility with appropriate resources: Identify barriers to discharge with patient and caregiver  6/7/2023 0416 by Juhi Sales RN  Outcome: Progressing  Flowsheets (Taken 6/7/2023 0404)  Discharge to home or other facility with appropriate resources: Identify discharge learning needs (meds, wound care, etc)     Problem: Skin/Tissue Integrity  Goal: Absence of new skin breakdown  Description: 1. Monitor for areas of redness and/or skin breakdown  2. Assess vascular access sites hourly  3. Every 4-6 hours minimum:  Change oxygen saturation probe site  4. Every 4-6 hours:  If on nasal continuous positive airway pressure, respiratory therapy assess nares and determine need for appliance change or resting period.   6/7/2023 1227 by Liane Vang RN  Outcome: Progressing  6/7/2023 0416 by Juhi Sales RN  Outcome: Progressing     Problem: ABCDS Injury Assessment  Goal: Absence of physical injury  6/7/2023 1227 by Liane Vang RN  Outcome: Progressing  6/7/2023 0416 by Juhi Sales RN  Outcome: Progressing     Problem: Safety - Adult  Goal: Free from fall injury  Outcome: Progressing

## 2023-06-07 NOTE — ED NOTES
Pt's daughter Shirley Calloway took all belongings with her home (clothes and phone). Daughter stated to please call her for any updates and if pt goes upstairs. If she doesn't answer please leave a voicemail.      Fiorella Vega, HERMAN  66/69/[phone]

## 2023-06-07 NOTE — H&P
HISTORY AND PHYSICAL             Date: 6/7/2023        Patient Name: Artemio Chavez     YOB: 1940      Age:  80 y.o. Chief Complaint     Chief Complaint   Patient presents with    Shortness of Breath          History Obtained From   patient, electronic medical record    History of Present Illness   Patient was directed to the hospital because of abnormal EKG of new bundle branch block and shortness of breath. Patient is a poor historian has a history of depression osteoporosis and hypertension a chest x-ray shows possible retrocardiac pneumonia she denies any cough no fever no chills    Past Medical History     Past Medical History:   Diagnosis Date    Depression     HTN (hypertension)     Hyperlipidemia     Osteoporosis         Past Surgical History     Past Surgical History:   Procedure Laterality Date    BLADDER REPAIR      bladder tuck    HYSTERECTOMY (CERVIX STATUS UNKNOWN)      OTHER SURGICAL HISTORY      right wrist    TONSILLECTOMY          Medications Prior to Admission     Prior to Admission medications    Medication Sig Start Date End Date Taking? Authorizing Provider   QUEtiapine (SEROQUEL) 300 MG tablet Take 1 tablet by mouth nightly at bedtime.  5/15/23  Yes Historical Provider, MD   aspirin 81 MG EC tablet Take by mouth daily    Ar Automatic Reconciliation   calcium citrate-vitamin D (CITRACAL+D) 315-5 MG-MCG TABS per tablet Patient held it back for hypercalcemia 6/1/21   Ar Automatic Reconciliation   docusate (COLACE, DULCOLAX) 100 MG CAPS Take 100 mg by mouth daily    Ar Automatic Reconciliation   fluvoxaMINE (LUVOX) 100 MG tablet Take 1 tablet by mouth 9/17/16   Ar Automatic Reconciliation   hydroCHLOROthiazide (HYDRODIURIL) 25 MG tablet Take 1 tablet by mouth daily    Ar Automatic Reconciliation   losartan (COZAAR) 50 MG tablet Take 40 mg by mouth daily    Ar Automatic Reconciliation   QUEtiapine (SEROQUEL) 100 MG tablet Take 300 mg by mouth nightly  Patient not taking:

## 2023-06-07 NOTE — PROGRESS NOTES
Family and patient would like patient to obtain ECHO to rule out anything. Dr. Espinoza Almendarez ordered ECHO       [phone] [id number]: Patient obtained ECHO.

## 2023-06-07 NOTE — PROGRESS NOTES
Consult received. Bedside swallow screen completed. Patient denies difficulty swallowing. RN reports tolerating present diet. No significant dysphagia noted at this time. Please re consult if swallow worsens or concerns for aspiration. No further swallow evaluation to follow.

## 2023-06-07 NOTE — CARE COORDINATION
06/07/23 1135   Service Assessment   Patient Orientation Alert and Oriented   Cognition Alert   History Provided By Patient   Primary [address] is: Legal Next of Kin   PCP Verified by CM Yes   Last Visit to PCP Within last 3 months   Prior Functional Level Independent in ADLs/IADLs   Current Functional Level Independent in ADLs/IADLs   Can patient return to prior living arrangement Yes   Ability to make needs known: Good   Family able to assist with home care needs: Yes   Would you like for me to discuss the discharge plan with any other family members/significant others, and if so, who? Yes  (daughter - Lilly Arvizu)   Financial Resources [employer] None   Social/Functional History   Lives With Alone   Type of Home Trailer   ADL Assistance Independent   Active  Yes   Mode of Transportation Car   Occupation Retired   Discharge Planning   Type of Residence Trailer/Mobile [address] [place] [address] Prior To Admission None   Type of [address] None   Patient expects to be discharged to: [facility]mobile home   [facility]   Services At/After Discharge None   Confirm Follow Up Transport Self     CM met with the patient at the bedside. Patient lives alone in a trailer. She is independent and cares for herself. She drives. Denies using any DME. Intends to discharge home self-care with no needs.

## 2023-06-07 NOTE — CONSULTS
3.1*      CO2 30   BUN 21*   BUNCRER 23*   MG 2.2   BILITOT 0.5   ALKPHOS 89   GLOB 3.5   AST 26   ALT 36          troponins 11, 13, 15, 18, 14    Prior to Admission medications    Medication Sig Start Date End Date Taking? Authorizing Provider   QUEtiapine (SEROQUEL) 300 MG tablet Take 1 tablet by mouth nightly at bedtime.  5/15/23  Yes Historical Provider, MD   aspirin 81 MG EC tablet Take by mouth daily    Ar Automatic Reconciliation   calcium citrate-vitamin D (CITRACAL+D) 315-5 MG-MCG TABS per tablet Patient held it back for hypercalcemia 6/1/21   Ar Automatic Reconciliation   docusate (COLACE, DULCOLAX) 100 MG CAPS Take 100 mg by mouth daily    Ar Automatic Reconciliation   fluvoxaMINE (LUVOX) 100 MG tablet Take 1 tablet by mouth 9/17/16   Ar Automatic Reconciliation   hydroCHLOROthiazide (HYDRODIURIL) 25 MG tablet Take 1 tablet by mouth daily    Ar Automatic Reconciliation   losartan (COZAAR) 50 MG tablet Take 40 mg by mouth daily    Ar Automatic Reconciliation   QUEtiapine (SEROQUEL) 100 MG tablet Take 300 mg by mouth nightly  Patient not taking: Reported on 6/7/2023    Ar Reagan Sacks, MD

## 2023-06-07 NOTE — PLAN OF CARE
Problem: Discharge Planning  Goal: Discharge to home or other facility with appropriate resources  Outcome: Progressing  Flowsheets (Taken 6/7/2023 0404)  Discharge to home or other facility with appropriate resources: Identify discharge learning needs (meds, wound care, etc)     Problem: Skin/Tissue Integrity  Goal: Absence of new skin breakdown  Description: 1. Monitor for areas of redness and/or skin breakdown  2. Assess vascular access sites hourly  3. Every 4-6 hours minimum:  Change oxygen saturation probe site  4. Every 4-6 hours:  If on nasal continuous positive airway pressure, respiratory therapy assess nares and determine need for appliance change or resting period.   Outcome: Progressing     Problem: ABCDS Injury Assessment  Goal: Absence of physical injury  Outcome: Progressing

## 2023-06-07 NOTE — ED NOTES
Dr Lili Jones called for patient BP being 192/95, verbal orders given to resume home meds, troponin q3 hrs x 2, lopressor 50 mg PO BID, and a cardiac diet.       Oneal Guadarrama RN  06/07/23 6589

## 2023-06-07 NOTE — ED NOTES
Message left for daughter for update on patient being transported upstairs to [address] [place] [address]  69/63/19 7184

## 2023-06-07 NOTE — PROGRESS NOTES
1700: Patient potassium is 3.1 from 6/6/23. No labs orders are in place today. CMP are scheduled for tomorrow. Dr. Yenni Gray ordered BMP STAT to monitor potassium.

## 2023-06-08 VITALS
BODY MASS INDEX: 27.47 KG/M2 | DIASTOLIC BLOOD PRESSURE: 73 MMHG | RESPIRATION RATE: 16 BRPM | WEIGHT: 175 LBS | SYSTOLIC BLOOD PRESSURE: 137 MMHG | TEMPERATURE: 98.1 F | HEIGHT: 67 IN | OXYGEN SATURATION: 92 % | HEART RATE: 53 BPM

## 2023-06-08 LAB
CHOLEST SERPL-MCNC: 304 MG/DL
ERYTHROCYTE [DISTWIDTH] IN BLOOD BY AUTOMATED COUNT: 13 % (ref 11.5–14.5)
HCT VFR BLD AUTO: 38.2 % (ref 35–47)
HDLC SERPL-MCNC: 47 MG/DL
HDLC SERPL: 6.5 (ref 0–5)
HGB BLD-MCNC: 12.4 G/DL (ref 11.5–16)
LDLC SERPL CALC-MCNC: 209 MG/DL (ref 0–100)
LIPID PANEL: ABNORMAL
MCH RBC QN AUTO: 30.8 PG (ref 26–34)
MCHC RBC AUTO-ENTMCNC: 32.5 G/DL (ref 30–36.5)
MCV RBC AUTO: 95 FL (ref 80–99)
NRBC # BLD: 0 K/UL (ref 0–0.01)
NRBC BLD-RTO: 0 PER 100 WBC
PLATELET # BLD AUTO: 236 K/UL (ref 150–400)
PMV BLD AUTO: 10.2 FL (ref 8.9–12.9)
RBC # BLD AUTO: 4.02 M/UL (ref 3.8–5.2)
TRIGL SERPL-MCNC: 240 MG/DL
TROPONIN I SERPL HS-MCNC: 12 NG/L (ref 0–51)
VLDLC SERPL CALC-MCNC: 48 MG/DL
WBC # BLD AUTO: 5 K/UL (ref 3.6–11)

## 2023-06-08 PROCEDURE — 84484 ASSAY OF TROPONIN QUANT: CPT

## 2023-06-08 PROCEDURE — 85027 COMPLETE CBC AUTOMATED: CPT

## 2023-06-08 PROCEDURE — 6360000002 HC RX W HCPCS: Performed by: INTERNAL MEDICINE

## 2023-06-08 PROCEDURE — 2580000003 HC RX 258: Performed by: INTERNAL MEDICINE

## 2023-06-08 PROCEDURE — 6370000000 HC RX 637 (ALT 250 FOR IP): Performed by: INTERNAL MEDICINE

## 2023-06-08 PROCEDURE — 80061 LIPID PANEL: CPT

## 2023-06-08 PROCEDURE — 36415 COLL VENOUS BLD VENIPUNCTURE: CPT

## 2023-06-08 RX ORDER — QUETIAPINE FUMARATE 300 MG/1
300 TABLET, FILM COATED ORAL NIGHTLY
Qty: 60 TABLET | Refills: 3 | Status: SHIPPED | OUTPATIENT
Start: 2023-06-08

## 2023-06-08 RX ORDER — METOPROLOL TARTRATE 50 MG/1
50 TABLET, FILM COATED ORAL 2 TIMES DAILY
Qty: 60 TABLET | Refills: 3 | Status: SHIPPED | OUTPATIENT
Start: 2023-06-08

## 2023-06-08 RX ORDER — POTASSIUM CHLORIDE 1.5 G/1.77G
20 POWDER, FOR SOLUTION ORAL DAILY
Status: DISCONTINUED | OUTPATIENT
Start: 2023-06-08 | End: 2023-06-08 | Stop reason: HOSPADM

## 2023-06-08 RX ORDER — CEFUROXIME AXETIL 500 MG/1
500 TABLET ORAL EVERY 12 HOURS SCHEDULED
Qty: 20 TABLET | Refills: 0 | Status: SHIPPED | OUTPATIENT
Start: 2023-06-08 | End: 2023-06-18

## 2023-06-08 RX ORDER — CEFUROXIME AXETIL 250 MG/1
500 TABLET ORAL EVERY 12 HOURS SCHEDULED
Status: DISCONTINUED | OUTPATIENT
Start: 2023-06-08 | End: 2023-06-08 | Stop reason: HOSPADM

## 2023-06-08 RX ORDER — POTASSIUM CHLORIDE 1.5 G/1.77G
20 POWDER, FOR SOLUTION ORAL DAILY
Qty: 30 EACH | Refills: 3 | Status: SHIPPED | OUTPATIENT
Start: 2023-06-08

## 2023-06-08 RX ORDER — AZITHROMYCIN 500 MG/1
250 TABLET, FILM COATED ORAL DAILY
Status: DISCONTINUED | OUTPATIENT
Start: 2023-06-09 | End: 2023-06-08 | Stop reason: HOSPADM

## 2023-06-08 RX ORDER — AZITHROMYCIN 500 MG/1
500 TABLET, FILM COATED ORAL ONCE
Status: COMPLETED | OUTPATIENT
Start: 2023-06-08 | End: 2023-06-08

## 2023-06-08 RX ORDER — AZITHROMYCIN 250 MG/1
250 TABLET, FILM COATED ORAL DAILY
Qty: 4 TABLET | Refills: 0 | Status: SHIPPED | OUTPATIENT
Start: 2023-06-09 | End: 2023-06-13

## 2023-06-08 RX ORDER — POTASSIUM CHLORIDE 1.5 G/1.77G
40 POWDER, FOR SOLUTION ORAL ONCE
Status: DISCONTINUED | OUTPATIENT
Start: 2023-06-08 | End: 2023-06-08 | Stop reason: HOSPADM

## 2023-06-08 RX ORDER — POLYETHYLENE GLYCOL 3350 17 G/17G
17 POWDER, FOR SOLUTION ORAL DAILY PRN
Qty: 100 G | Refills: 0 | Status: SHIPPED | OUTPATIENT
Start: 2023-06-08 | End: 2023-07-08

## 2023-06-08 RX ADMIN — AZITHROMYCIN MONOHYDRATE 500 MG: 500 TABLET ORAL at 10:08

## 2023-06-08 RX ADMIN — CEFUROXIME AXETIL 500 MG: 250 TABLET, FILM COATED ORAL at 10:08

## 2023-06-08 RX ADMIN — ENOXAPARIN SODIUM 40 MG: 100 INJECTION SUBCUTANEOUS at 10:10

## 2023-06-08 RX ADMIN — POTASSIUM CHLORIDE 20 MEQ: 1.5 FOR SOLUTION ORAL at 10:09

## 2023-06-08 RX ADMIN — METOPROLOL TARTRATE 50 MG: 50 TABLET, FILM COATED ORAL at 10:08

## 2023-06-08 RX ADMIN — SODIUM CHLORIDE, PRESERVATIVE FREE 10 ML: 5 INJECTION INTRAVENOUS at 10:09

## 2023-06-08 RX ADMIN — HYDROCHLOROTHIAZIDE 25 MG: 25 TABLET ORAL at 10:08

## 2023-06-08 RX ADMIN — LOSARTAN POTASSIUM 50 MG: 50 TABLET, FILM COATED ORAL at 10:08

## 2023-06-08 RX ADMIN — POTASSIUM CHLORIDE 40 MEQ: 1500 TABLET, EXTENDED RELEASE ORAL at 05:52

## 2023-06-08 RX ADMIN — ASPIRIN 81 MG 81 MG: 81 TABLET ORAL at 10:08

## 2023-06-08 NOTE — PROGRESS NOTES
Discharge nurse reviewed DC instructions with patient and family at bedside. No further questions. Patient taken downstairs by wheelchair. No signs or symptoms of distress noted.

## 2023-06-08 NOTE — CARE COORDINATION
4841: Discharge order home noted. Summary pending. When transportation available, patient to discharge home per order. Discharge Checklist Completed. Transition of Care Plan:    RUR: 12%  Prior Level of Functioning: Independent  Disposition: Home  If SNF or IPR: Date FOC offered: N/A  Date FOC received: N/A  Accepting facility: N/A  Date authorization started with reference number: N/A  Date authorization received and expires: N/A  Follow up appointments: Discharge Summary  DME needed: None  Transportation at discharge: Family  IM/IMM Medicare/ letter given: 06/06/2023  Is patient a  and connected with [facility]? No  If yes, was [employer] transfer form completed and VA notified? N/A  Caregiver Contact: No  Discharge Caregiver contacted prior to discharge? No  Care Conference needed?  No  Barriers to discharge: None

## 2023-06-08 NOTE — DISCHARGE INSTR - COC
Continuity of Care Form    Patient Name: Kb Guaman   :  1940  MRN:  [id number]    [facility] date:  2023  Discharge date:  2023    Code Status Order: Full Code   Advance Directives:     Admitting Physician:  Brittany Guzman MD  PCP: Rancho Escamilla MD    Discharging Nurse: Tiny Hernandez RN  [address] Unit/Room#: 225/01  Discharging Unit Phone Number: [phone]    Emergency Contact:   Extended Emergency Contact Information  Primary Emergency Contact: Joyce Robin Phone: [phone]  Relation: Child    Past Surgical History:  Past Surgical History:   Procedure Laterality Date    BLADDER REPAIR      bladder tuck    HYSTERECTOMY (CERVIX STATUS UNKNOWN)      OTHER SURGICAL HISTORY      right wrist    TONSILLECTOMY         Immunization History: There is no immunization history on file for this patient. Active Problems:  Patient Active Problem List   Diagnosis Code    Essential hypertension I10    Hyperlipidemia E78.5    Osteoporosis M81.0    Depression F32. A    GEORGE (dyspnea on exertion) R06.09    Cardiac arrhythmia I49.9       Isolation/Infection:   Isolation            No Isolation          Patient Infection Status       None to display            Nurse Assessment:  Last Vital Signs: /73   Pulse 53   Temp 98.1 °F (36.7 °C) (Oral)   Resp 16   Ht 1.702 m (5' 7\")   Wt 79.4 kg (175 lb)   SpO2 92%   BMI 27.41 kg/m²     Last documented pain score (0-10 scale):    Last Weight:   Wt Readings from Last 1 Encounters:   23 79.4 kg (175 lb)     Mental Status:  oriented    IV Access:  - None    Nursing Mobility/ADLs:  Walking   Independent  Transfer  Independent  Bathing  Independent  Dressing  Independent  Toileting  Independent  Feeding  Independent  Med Admin  Independent  Med Delivery   whole    Wound Care Documentation and Therapy:        Elimination:  Continence:    Bowel: Yes  Bladder: Yes  Urinary Catheter: None   Colostomy/Ileostomy/Ileal Conduit: No

## 2023-06-24 NOTE — DISCHARGE SUMMARY
Discharge Summary     Patient: Deidra Moore MRN: [id number]  SSN: [id number]    YOB: 1940  Age: 80 y.o. Sex: female       Admit Date: 6/6/2023    Discharge Date: 6/24/2023      Admission Diagnoses: GEORGE (dyspnea on exertion) [R06.09]  Acute chest pain [R07.9]  Cardiac arrhythmia [I49.9]    Discharge Diagnoses:      Discharge Condition: Good    Hospital Course: Patient presented patient presented with shortness of breath admitted for pneumonia possible cardiac arrhythmia cognitive disorder depression patient was admitted presented to echocardiogram was obtained cardilogy consult was obtainedPatient patient was seen by Dr. Johnson Nevarez recommendation was for outpatient stress test    Consults: Cardiology    Significant Diagnostic Studies: labs:     XR CHEST PORTABLE   Final Result      Possible retrocardiac airspace opacities in the left lung base which may   represent pneumonia. Disposition: home    Discharge Medications:   Discharge Medication List as of 6/8/2023 10:33 AM        START taking these medications    Details   azithromycin (ZITHROMAX) 250 MG tablet Take 1 tablet by mouth daily for 4 doses, Disp-4 tablet, R-0Normal      polyethylene glycol (GLYCOLAX) 17 g packet Take 17 g by mouth daily as needed for Constipation, Disp-100 g, R-0Normal      metoprolol tartrate (LOPRESSOR) 50 MG tablet Take 1 tablet by mouth 2 times daily, Disp-60 tablet, R-3Normal      cefUROXime (CEFTIN) 500 MG tablet Take 1 tablet by mouth every 12 hours for 10 days, Disp-20 tablet, R-0Normal      potassium chloride (KLOR-CON) 20 MEQ packet Take 20 mEq by mouth daily, Disp-30 each, R-3Normal           CONTINUE these medications which have CHANGED    Details   !! QUEtiapine (SEROQUEL) 300 MG tablet Take 1 tablet by mouth nightly, Disp-60 tablet, R-3Normal       !! - Potential duplicate medications found. Please discuss with provider.         CONTINUE these medications which have NOT CHANGED    Details   !!

## 2024-07-05 ENCOUNTER — HOSPITAL ENCOUNTER (EMERGENCY)
Facility: HOSPITAL | Age: 84
Discharge: HOME | End: 2024-07-05
Attending: STUDENT IN AN ORGANIZED HEALTH CARE EDUCATION/TRAINING PROGRAM
Payer: MEDICARE

## 2024-07-05 VITALS
BODY MASS INDEX: 28.25 KG/M2 | TEMPERATURE: 98.3 F | SYSTOLIC BLOOD PRESSURE: 167 MMHG | RESPIRATION RATE: 16 BRPM | DIASTOLIC BLOOD PRESSURE: 110 MMHG | OXYGEN SATURATION: 95 % | WEIGHT: 180 LBS | HEIGHT: 67 IN | HEART RATE: 91 BPM

## 2024-07-05 DIAGNOSIS — R03.0 ELEVATED BLOOD PRESSURE READING: ICD-10-CM

## 2024-07-05 DIAGNOSIS — S61.213A LACERATION OF LEFT MIDDLE FINGER WITHOUT FOREIGN BODY WITHOUT DAMAGE TO NAIL, INITIAL ENCOUNTER: Primary | ICD-10-CM

## 2024-07-05 PROCEDURE — 90471 IMMUNIZATION ADMIN: CPT | Performed by: NURSE PRACTITIONER

## 2024-07-05 PROCEDURE — 90714 TD VACC NO PRESV 7 YRS+ IM: CPT | Performed by: NURSE PRACTITIONER

## 2024-07-05 PROCEDURE — 99284 EMERGENCY DEPT VISIT MOD MDM: CPT

## 2024-07-05 PROCEDURE — 6370000000 HC RX 637 (ALT 250 FOR IP): Performed by: NURSE PRACTITIONER

## 2024-07-05 PROCEDURE — 6360000002 HC RX W HCPCS: Performed by: NURSE PRACTITIONER

## 2024-07-05 RX ORDER — GINSENG 100 MG
CAPSULE ORAL
Status: COMPLETED | OUTPATIENT
Start: 2024-07-05 | End: 2024-07-05

## 2024-07-05 RX ADMIN — BACITRACIN 1 EACH: 500 OINTMENT TOPICAL at 17:09

## 2024-07-05 RX ADMIN — CLOSTRIDIUM TETANI TOXOID ANTIGEN (FORMALDEHYDE INACTIVATED) AND CORYNEBACTERIUM DIPHTHERIAE TOXOID ANTIGEN (FORMALDEHYDE INACTIVATED) 0.5 ML: 5; 2 INJECTION, SUSPENSION INTRAMUSCULAR at 17:09

## 2024-07-05 ASSESSMENT — PAIN SCALES - GENERAL: PAINLEVEL_OUTOF10: 3

## 2024-07-05 ASSESSMENT — PAIN DESCRIPTION - LOCATION: LOCATION: FINGER (COMMENT WHICH ONE)

## 2024-07-05 ASSESSMENT — PAIN - FUNCTIONAL ASSESSMENT: PAIN_FUNCTIONAL_ASSESSMENT: 0-10

## 2024-07-05 ASSESSMENT — PAIN DESCRIPTION - ORIENTATION: ORIENTATION: RIGHT

## 2024-07-05 ASSESSMENT — PAIN DESCRIPTION - PAIN TYPE: TYPE: ACUTE PAIN

## 2024-07-05 NOTE — ED TRIAGE NOTES
Patient arrives reports laceration to right 3 third digit while attempting to look in air conditioner. Reports bleeding made her concerned enough to come to ED. Bleeding controlled in triage. Denies being on blood thinners.

## 2024-07-05 NOTE — DISCHARGE INSTRUCTIONS
Keep the cut dry for the first 24 to 48 hours. After this, you can shower if your doctor okays it. Pat the cut dry.  Don't soak the cut, such as in a bathtub. Your doctor will tell you when it's safe to get the cut wet.  If your doctor told you how to care for your cut, follow your doctor's instructions. If you did not get instructions, follow this general advice:  Do not put any kind of ointment, cream, or lotion over the area. This can make the adhesive fall off too soon.  After the first 24 to 48 hours, wash around the cut with clean water 2 times a day. Do not use hydrogen peroxide or alcohol, which can slow healing.  If the doctor told you to use a bandage, put on a new bandage after cleaning the cut or if the bandage gets wet or dirty.  Prop up the sore area on a pillow anytime you sit or lie down during the next 3 days. Try to keep it above the level of your heart. This will help reduce swelling.  Leave the skin adhesive on your skin until it falls off on its own. This may take 5 to 10 days.  Do not scratch, rub, or pick at the adhesive.  Do not put the sticky part of a bandage directly on the adhesive.  Avoid any activity that could cause your cut to reopen.  Be safe with medicines. Read and follow all instructions on the label.  If the doctor gave you a prescription medicine for pain, take it as prescribed.  If you are not taking a prescription pain medicine, ask your doctor if you can take an over-the-counter medicine.

## 2024-07-05 NOTE — ED PROVIDER NOTES
[facility] EMERGENCY DEPT  EMERGENCY DEPARTMENT ENCOUNTER      Pt Name: Yaneli Corrales  MRN: [id number]  Birthdate 1940  Date of evaluation: 7/5/2024  Provider: ROBERTO Ling NP    CHIEF COMPLAINT       Chief Complaint   Patient presents with    Laceration     Right 3rd digit         HISTORY OF PRESENT ILLNESS   (Location/Symptom, Timing/Onset, Context/Setting, Quality, Duration, Modifying Factors, Severity)  Note limiting factors.   The history is provided by the patient. No  was used.       Yaneli Corrales is a 84 y.o. female with Hx of complex cardiac arrhythmia, hyperlipidemia, osteoporosis, depression who presents ambulatory to [facility] ED with cc of finger injury.     Pt was trying to remove metal air filter and cut tip of 3rd finger RIGHT hand PTA. Last TD unknown.  Has sensation to her finger without difficulty and no changes to range of motion.  Does not take anticoagulation.    Denies any other medical concerns.  Denies tobacco, alcohol, substance abuse.          PCP: Rei Gonzalez MD    There are no other complaints, changes or physical findings at this time.        Review of External Medical Records:     Nursing Notes were reviewed.    REVIEW OF SYSTEMS    (2-9 systems for level 4, 10 or more for level 5)     Review of Systems   Musculoskeletal:  Positive for arthralgias.   Skin:  Positive for wound.       Except as noted above the remainder of the review of systems was reviewed and negative.       PAST MEDICAL HISTORY     Past Medical History:   Diagnosis Date    Depression     HTN (hypertension)     Hyperlipidemia     Osteoporosis          SURGICAL HISTORY       Past Surgical History:   Procedure Laterality Date    BLADDER REPAIR      bladder tuck    HYSTERECTOMY (CERVIX STATUS UNKNOWN)      OTHER SURGICAL HISTORY      right wrist    TONSILLECTOMY           CURRENT MEDICATIONS       Discharge Medication List as of 7/5/2024  5:09 PM        CONTINUE these    Height: 1.702 m (5' 7\")           Medical Decision Making  Patient arrives to the emergency department with small laceration, has a well aligned skin flap, to the third finger on her left hand.  There is no evidence of uncontrolled bleeding.  Patient is very anxious on arrival, which is likely why she has a more elevated blood pressure reading.  Nursing staff performed wound care at bedside, based off presence of wound there is no indication for any further intervention with wound repair.  Discussed outpatient wound care management with patient prior to discharge, tetanus shot updated.    Risk  OTC drugs.  Prescription drug management.            REASSESSMENT            CONSULTS:  None    PROCEDURES:  Unless otherwise noted below, none     Procedures      FINAL IMPRESSION      1. Laceration of left middle finger without foreign body without damage to nail, initial encounter    2. Elevated blood pressure reading          DISPOSITION/PLAN   DISPOSITION Decision To Discharge 07/05/2024 05:21:15 PM      PATIENT REFERRED TO:  Rei Gonzalez MD  [address] [facility] [place] [address]  [phone]    Schedule an appointment as soon as possible for a visit       [facility] [place] [facility] EMERGENCY DEPT  [address]  [facility] [address] [phone]  Go to   As needed, If symptoms worsen      DISCHARGE MEDICATIONS:  Discharge Medication List as of 7/5/2024  5:09 PM            (Please note that portions of this note were completed with a voice recognition program.  Efforts were made to edit the dictations but occasionally words are mis-transcribed.)    ROBERTO Ling NP (electronically signed)  Emergency Attending Physician / Physician Assistant / Nurse Practitioner             Reena Vega APRN - NP  07/05/24 5544

## 2024-08-01 ENCOUNTER — HOSPITAL ENCOUNTER (EMERGENCY)
Facility: HOSPITAL | Age: 84
Discharge: HOME | End: 2024-08-01
Attending: STUDENT IN AN ORGANIZED HEALTH CARE EDUCATION/TRAINING PROGRAM
Payer: MEDICARE

## 2024-08-01 VITALS
RESPIRATION RATE: 16 BRPM | TEMPERATURE: 97.8 F | OXYGEN SATURATION: 95 % | DIASTOLIC BLOOD PRESSURE: 67 MMHG | HEART RATE: 73 BPM | SYSTOLIC BLOOD PRESSURE: 137 MMHG

## 2024-08-01 DIAGNOSIS — S61.215A LACERATION OF LEFT RING FINGER WITHOUT FOREIGN BODY WITHOUT DAMAGE TO NAIL, INITIAL ENCOUNTER: Primary | ICD-10-CM

## 2024-08-01 PROCEDURE — 99283 EMERGENCY DEPT VISIT LOW MDM: CPT

## 2024-08-01 RX ORDER — CEPHALEXIN 500 MG/1
500 CAPSULE ORAL 3 TIMES DAILY
Qty: 15 CAPSULE | Refills: 0 | Status: SHIPPED | OUTPATIENT
Start: 2024-08-01 | End: 2024-08-06

## 2024-08-01 ASSESSMENT — PAIN SCALES - GENERAL: PAINLEVEL_OUTOF10: 2

## 2024-08-01 ASSESSMENT — PAIN - FUNCTIONAL ASSESSMENT: PAIN_FUNCTIONAL_ASSESSMENT: 0-10

## 2024-08-01 NOTE — DISCHARGE INSTRUCTIONS
Keep the laceration clean and dry.  Apply dressing if there is any oozing of blood, or for protection.  Monitor for signs of infection, including swelling, pain, redness, or any kind of drainage, especially, if it appears like pus, or if you have fever.

## 2024-08-01 NOTE — ED PROVIDER NOTES
[facility] [place] [place]  EMERGENCY DEPARTMENT ENCOUNTER      Pt Name: Yaneli Corrales  MRN: [id number]  Birthdate 1940  Date of evaluation: 8/1/2024  Provider: Mireille Littlejohn PA-C    CHIEF COMPLAINT       Chief Complaint   Patient presents with    Laceration         HISTORY OF PRESENT ILLNESS   (Location/Symptom, Timing/Onset, Context/Setting, Quality, Duration, Modifying Factors, Severity)  Note limiting factors.   The patient is an 84-year-old female presents emergency department with a laceration to the left ring finger, distal tip, caused by a knife while she was using it to cut food yesterday evening, approximately 6 or 6:30 PM.  The patient states that the knife was very sharp, it is new, and this is why laceration occurred.    She wrapped the digit with some gauze, she applied some Coban, the bleeding was stopped, she opened it this morning, and felt she needed to have it assessed.    The patient states her tetanus immunization is up-to-date, she was here approximately 1 month ago when she had a laceration from changing the filter in her air intake grate.     She denies numbness to the fingertip, there is some throbbing, and pain, but she has full range of motion.    The history is provided by the patient.         Review of External Medical Records:     Nursing Notes were reviewed.    REVIEW OF SYSTEMS    (2-9 systems for level 4, 10 or more for level 5)     Review of Systems    Except as noted above the remainder of the review of systems was reviewed and negative.       PAST MEDICAL HISTORY     Past Medical History:   Diagnosis Date    Depression     HTN (hypertension)     Hyperlipidemia     Osteoporosis          SURGICAL HISTORY       Past Surgical History:   Procedure Laterality Date    BLADDER REPAIR      bladder tuck    HYSTERECTOMY (CERVIX STATUS UNKNOWN)      OTHER SURGICAL HISTORY      right wrist    TONSILLECTOMY           CURRENT MEDICATIONS       Discharge Medication List as of 8/1/2024   would have been recommended.  The patient's tetanus immunization was confirmed up to date, related to an injury she sustained approximately a month ago.  She is placed on cephalexin for infection prophylaxis.    She was given wound care instructions, advised to monitor the laceration, and if she has any concerns for infection, including warmth, tenderness, drainage, or increased pain, she should be reevaluated.  The patient voiced understanding.    Risk  Prescription drug management.            REASSESSMENT            CONSULTS:  None    PROCEDURES:  Unless otherwise noted below, none     Procedures      FINAL IMPRESSION      1. Laceration of left ring finger without foreign body without damage to nail, initial encounter          DISPOSITION/PLAN   DISPOSITION Decision To Discharge 08/01/2024 02:16:36 PM      PATIENT REFERRED TO:  Rei Gonzalez MD  [address] [facility] [address]  [phone]      As needed    [facility] [place]  [address]  [facility] [address]  [phone]    If symptoms worsen      DISCHARGE MEDICATIONS:  Discharge Medication List as of 8/1/2024  2:16 PM        START taking these medications    Details   cephALEXin (KEFLEX) 500 MG capsule Take 1 capsule by mouth 3 times daily for 5 days, Disp-15 capsule, R-0Normal               (Please note that portions of this note were completed with a voice recognition program.  Efforts were made to edit the dictations but occasionally words are mis-transcribed.)    Mireille Littlejohn PA-C (electronically signed)  Emergency Attending Physician / Physician Assistant / Nurse Practitioner             Mireille Littlejohn PA-C  08/04/24 5111

## 2024-08-01 NOTE — ED TRIAGE NOTES
Patient ambulatory to ED c/o laceration to L 4th digit last night. Got a new set of kitchen knives and accidentally cut herself. Clean knife, UTD on tetanus. Denies blood thinners but says the finger still bleeds.

## 2024-08-29 ENCOUNTER — OFFICE VISIT (OUTPATIENT)
Age: 84
End: 2024-08-29
Payer: MEDICARE

## 2024-08-29 VITALS — WEIGHT: 183 LBS | BODY MASS INDEX: 28.72 KG/M2 | HEIGHT: 67 IN

## 2024-08-29 DIAGNOSIS — Z01.419 GYNECOLOGIC EXAM NORMAL: ICD-10-CM

## 2024-08-29 DIAGNOSIS — Z12.72 SCREENING FOR VAGINAL CANCER: ICD-10-CM

## 2024-08-29 DIAGNOSIS — N95.1 MENOPAUSAL SYNDROME: ICD-10-CM

## 2024-08-29 DIAGNOSIS — Z12.31 SCREENING MAMMOGRAM FOR BREAST CANCER: Primary | ICD-10-CM

## 2024-08-29 DIAGNOSIS — N64.4 MASTODYNIA: ICD-10-CM

## 2024-08-29 DIAGNOSIS — Z90.710 HISTORY OF HYSTERECTOMY: ICD-10-CM

## 2024-08-29 DIAGNOSIS — N63.10 MASS OF RIGHT BREAST, UNSPECIFIED QUADRANT: ICD-10-CM

## 2024-08-29 PROCEDURE — G8419 CALC BMI OUT NRM PARAM NOF/U: HCPCS | Performed by: OBSTETRICS & GYNECOLOGY

## 2024-08-29 PROCEDURE — G0101 CA SCREEN;PELVIC/BREAST EXAM: HCPCS | Performed by: OBSTETRICS & GYNECOLOGY

## 2024-08-29 PROCEDURE — G8427 DOCREV CUR MEDS BY ELIG CLIN: HCPCS | Performed by: OBSTETRICS & GYNECOLOGY

## 2024-08-29 ASSESSMENT — ENCOUNTER SYMPTOMS
GASTROINTESTINAL NEGATIVE: 1
RESPIRATORY NEGATIVE: 1

## 2024-08-29 NOTE — PROGRESS NOTES
Chief Complaint   Patient presents with    Annual Exam     Wndwz-2-11-22     Ht 1.702 m (5' 7\")   Wt 83 kg (183 lb)   BMI 28.66 kg/m²

## 2024-08-29 NOTE — PROGRESS NOTES
Yaneli Corrales is a No obstetric history on file., 84 y.o. female   No LMP recorded. Patient has had a hysterectomy.    She presents for her annual    She is having  some soreness and palpated an area in the right breast .      Menstrual status:  Cycles are hysterectomy.    Flow: absent.      She does not have dysmenorrhea.      Medical conditions:  Since her last annual GYN exam about one year ago, she has not the following changes in her health history: none.     Mammogram History:    MARY Results (most recent):  @[facility]([id number]:1)@     DEXA Results (most recent):  @[facility]([id number]:1)@       Past Medical History:   Diagnosis Date    Depression     HTN (hypertension)     Hyperlipidemia     Osteoporosis      Past Surgical History:   Procedure Laterality Date    BLADDER REPAIR      bladder tuck    HYSTERECTOMY (CERVIX STATUS UNKNOWN)      OTHER SURGICAL HISTORY      right wrist    TONSILLECTOMY         Prior to Admission medications    Medication Sig Start Date End Date Taking? Authorizing Provider   QUEtiapine (SEROQUEL) 300 MG tablet Take 1 tablet by mouth nightly 6/8/23  Yes Navi Escalante MD   metoprolol tartrate (LOPRESSOR) 50 MG tablet Take 1 tablet by mouth 2 times daily 6/8/23  Yes Navi Escalante MD   potassium chloride (KLOR-CON) 20 MEQ packet Take 20 mEq by mouth daily 6/8/23  Yes Navi Escalante MD   QUEtiapine (SEROQUEL) 300 MG tablet Take 1 tablet by mouth nightly at bedtime. 5/15/23  Yes Provider, MD Surekha   aspirin 81 MG EC tablet Take by mouth daily   Yes Automatic Reconciliation, Ar   calcium citrate-vitamin D (CITRACAL+D) 315-5 MG-MCG TABS per tablet Patient held it back for hypercalcemia 6/1/21  Yes Automatic Reconciliation, Ar   docusate (COLACE, DULCOLAX) 100 MG CAPS Take 100 mg by mouth daily   Yes Automatic Reconciliation, Ar   fluvoxaMINE (LUVOX) 100 MG tablet Take 1 tablet by mouth 9/17/16  Yes Automatic Reconciliation, Ar   hydroCHLOROthiazide (HYDRODIURIL)  25 MG tablet Take 1 tablet by mouth daily   Yes Automatic Reconciliation, Ar   losartan (COZAAR) 50 MG tablet Take 40 mg by mouth daily   Yes Automatic Reconciliation, Ar       No Known Allergies       Tobacco History:  reports that she quit smoking about 51 years ago. Her smoking use included cigarettes. She has never used smokeless tobacco.  Alcohol use:  reports no history of alcohol use.  Drug use:  reports no history of drug use.    Family Medical/Cancer History:   Family History   Problem Relation Age of Onset    Alcohol Abuse Father     Cancer Mother         lung    Hypertension Sister         Review of Systems   Constitutional: Negative.    Respiratory: Negative.     Cardiovascular: Negative.    Gastrointestinal: Negative.    Genitourinary: Negative.    Musculoskeletal: Negative.    Neurological: Negative.    Psychiatric/Behavioral: Negative.           Ht 1.702 m (5' 7\")   Wt 83 kg (183 lb)   BMI 28.66 kg/m²     Physical Exam  Constitutional:       Appearance: Normal appearance.   Cardiovascular:      Rate and Rhythm: Normal rate and regular rhythm.   Pulmonary:      Effort: Pulmonary effort is normal.      Breath sounds: Normal breath sounds.   Chest:   Breasts:     Right: Normal.      Left: Normal.   Abdominal:      General: Abdomen is flat.      Palpations: Abdomen is soft.   Genitourinary:     General: Normal vulva.      Vagina: Normal.      Uterus: Absent.       Adnexa: Right adnexa normal and left adnexa normal.      Rectum: Normal.      Comments: Pap obtained  Urethra normal  Atrophy present  Musculoskeletal:         General: Normal range of motion.   Neurological:      General: No focal deficit present.      Mental Status: She is alert and oriented to person, place, and time. Mental status is at baseline.   Psychiatric:         Mood and Affect: Mood normal.         Behavior: Behavior normal.         Thought Content: Thought content normal.         Judgment: Judgment normal.              Assessment:

## 2024-09-02 LAB
., LABCORP: NORMAL
CYTOLOGIST CVX/VAG CYTO: NORMAL
CYTOLOGY CVX/VAG DOC CYTO: NORMAL
CYTOLOGY CVX/VAG DOC THIN PREP: NORMAL
DX ICD CODE: NORMAL
Lab: NORMAL
OTHER STN SPEC: NORMAL
STAT OF ADQ CVX/VAG CYTO-IMP: NORMAL

## 2024-09-05 ENCOUNTER — TELEPHONE (OUTPATIENT)
Age: 84
End: 2024-09-05

## 2024-09-05 NOTE — TELEPHONE ENCOUNTER
Caller from [employer] stated \" the pt is going to need a order for a Diagnostic mammo and ultrasound. It can be faxed to [phone].\"

## 2024-11-22 DIAGNOSIS — Z12.31 SCREENING MAMMOGRAM FOR BREAST CANCER: ICD-10-CM

## 2024-11-22 DIAGNOSIS — N64.4 MASTODYNIA: ICD-10-CM

## 2024-11-22 DIAGNOSIS — N63.10 MASS OF RIGHT BREAST, UNSPECIFIED QUADRANT: ICD-10-CM

## 2025-01-30 ENCOUNTER — APPOINTMENT (OUTPATIENT)
Facility: HOSPITAL | Age: 85
End: 2025-01-30
Payer: MEDICARE

## 2025-01-30 ENCOUNTER — HOSPITAL ENCOUNTER (EMERGENCY)
Facility: HOSPITAL | Age: 85
Discharge: HOME | End: 2025-01-30
Attending: EMERGENCY MEDICINE
Payer: MEDICARE

## 2025-01-30 VITALS
SYSTOLIC BLOOD PRESSURE: 167 MMHG | HEART RATE: 101 BPM | BODY MASS INDEX: 27.47 KG/M2 | DIASTOLIC BLOOD PRESSURE: 108 MMHG | RESPIRATION RATE: 20 BRPM | HEIGHT: 67 IN | OXYGEN SATURATION: 93 % | WEIGHT: 175 LBS | TEMPERATURE: 97.5 F

## 2025-01-30 DIAGNOSIS — R10.2 SUPRAPUBIC PAIN: Primary | ICD-10-CM

## 2025-01-30 DIAGNOSIS — I26.99 OTHER ACUTE PULMONARY EMBOLISM WITHOUT ACUTE COR PULMONALE (HCC): ICD-10-CM

## 2025-01-30 LAB
ALBUMIN SERPL-MCNC: 4.4 G/DL (ref 3.5–5.2)
ALBUMIN/GLOB SERPL: 1.7 (ref 1.1–2.2)
ALP SERPL-CCNC: 87 U/L (ref 35–104)
ALT SERPL-CCNC: 19 U/L (ref 10–35)
ANION GAP SERPL CALC-SCNC: 16 MMOL/L (ref 2–12)
APPEARANCE UR: ABNORMAL
AST SERPL-CCNC: 20 U/L (ref 10–35)
BACTERIA URNS QL MICRO: NEGATIVE /HPF
BASOPHILS # BLD: 0.08 K/UL (ref 0–0.1)
BASOPHILS NFR BLD: 0.6 % (ref 0–1)
BILIRUB SERPL-MCNC: 0.6 MG/DL (ref 0.2–1)
BILIRUB UR QL: NEGATIVE
BUN SERPL-MCNC: 22 MG/DL (ref 8–23)
BUN/CREAT SERPL: 26 (ref 12–20)
CALCIUM SERPL-MCNC: 10.4 MG/DL (ref 8.8–10.2)
CAOX CRY URNS QL MICRO: ABNORMAL
CHLORIDE SERPL-SCNC: 98 MMOL/L (ref 98–107)
CO2 SERPL-SCNC: 26 MMOL/L (ref 22–29)
COLOR UR: ABNORMAL
COMMENT:: NORMAL
CREAT SERPL-MCNC: 0.85 MG/DL (ref 0.5–0.9)
DIFFERENTIAL METHOD BLD: ABNORMAL
EOSINOPHIL # BLD: 0.08 K/UL (ref 0–0.4)
EOSINOPHIL NFR BLD: 0.6 % (ref 0–7)
EPITH CASTS URNS QL MICRO: ABNORMAL /LPF
ERYTHROCYTE [DISTWIDTH] IN BLOOD BY AUTOMATED COUNT: 13.1 % (ref 11.5–14.5)
GLOBULIN SER CALC-MCNC: 2.6 G/DL (ref 2–4)
GLUCOSE SERPL-MCNC: 114 MG/DL (ref 65–100)
GLUCOSE UR STRIP.AUTO-MCNC: NEGATIVE MG/DL
HCT VFR BLD AUTO: 43.2 % (ref 35–47)
HGB BLD-MCNC: 14.1 G/DL (ref 11.5–16)
HGB UR QL STRIP: NEGATIVE
IMM GRANULOCYTES # BLD AUTO: 0.03 K/UL (ref 0–0.04)
IMM GRANULOCYTES NFR BLD AUTO: 0.2 % (ref 0–0.5)
KETONES UR QL STRIP.AUTO: ABNORMAL MG/DL
LEUKOCYTE ESTERASE UR QL STRIP.AUTO: ABNORMAL
LYMPHOCYTES # BLD: 0.8 K/UL (ref 0.8–3.5)
LYMPHOCYTES NFR BLD: 6.4 % (ref 12–49)
MCH RBC QN AUTO: 31.2 PG (ref 26–34)
MCHC RBC AUTO-ENTMCNC: 32.6 G/DL (ref 30–36.5)
MCV RBC AUTO: 95.6 FL (ref 80–99)
MONOCYTES # BLD: 0.72 K/UL (ref 0–1)
MONOCYTES NFR BLD: 5.8 % (ref 5–13)
NEUTS SEG # BLD: 10.75 K/UL (ref 1.8–8)
NEUTS SEG NFR BLD: 86.4 % (ref 32–75)
NITRITE UR QL STRIP.AUTO: NEGATIVE
NRBC # BLD: 0 K/UL (ref 0–0.01)
NRBC BLD-RTO: 0 PER 100 WBC
PH UR STRIP: 6 (ref 5–8)
PLATELET # BLD AUTO: 304 K/UL (ref 150–400)
PMV BLD AUTO: 10.3 FL (ref 8.9–12.9)
POTASSIUM SERPL-SCNC: 3.9 MMOL/L (ref 3.5–5.1)
PROT SERPL-MCNC: 7 G/DL (ref 6.4–8.3)
PROT UR STRIP-MCNC: ABNORMAL MG/DL
RBC # BLD AUTO: 4.52 M/UL (ref 3.8–5.2)
RBC #/AREA URNS HPF: ABNORMAL /HPF
SODIUM SERPL-SCNC: 140 MMOL/L (ref 136–145)
SP GR UR REFRACTOMETRY: 1.02 (ref 1–1.03)
SPECIMEN HOLD: NORMAL
UROBILINOGEN UR QL STRIP.AUTO: 1 EU/DL (ref 0.2–1)
WBC # BLD AUTO: 12.5 K/UL (ref 3.6–11)
WBC URNS QL MICRO: ABNORMAL /HPF (ref 0–4)

## 2025-01-30 PROCEDURE — 99285 EMERGENCY DEPT VISIT HI MDM: CPT

## 2025-01-30 PROCEDURE — 81001 URINALYSIS AUTO W/SCOPE: CPT

## 2025-01-30 PROCEDURE — 74177 CT ABD & PELVIS W/CONTRAST: CPT

## 2025-01-30 PROCEDURE — 6360000004 HC RX CONTRAST MEDICATION: Performed by: EMERGENCY MEDICINE

## 2025-01-30 PROCEDURE — 85025 COMPLETE CBC W/AUTO DIFF WBC: CPT

## 2025-01-30 PROCEDURE — 80053 COMPREHEN METABOLIC PANEL: CPT

## 2025-01-30 PROCEDURE — 71275 CT ANGIOGRAPHY CHEST: CPT

## 2025-01-30 PROCEDURE — 6370000000 HC RX 637 (ALT 250 FOR IP): Performed by: EMERGENCY MEDICINE

## 2025-01-30 RX ORDER — IOPAMIDOL 755 MG/ML
100 INJECTION, SOLUTION INTRAVASCULAR
Status: COMPLETED | OUTPATIENT
Start: 2025-01-30 | End: 2025-01-30

## 2025-01-30 RX ADMIN — IOPAMIDOL 100 ML: 755 INJECTION, SOLUTION INTRAVENOUS at 19:59

## 2025-01-30 RX ADMIN — APIXABAN 10 MG: 5 TABLET, FILM COATED ORAL at 22:17

## 2025-01-30 ASSESSMENT — ENCOUNTER SYMPTOMS
SORE THROAT: 0
NAUSEA: 0
BELCHING: 0
CHEST TIGHTNESS: 0
EYE DISCHARGE: 0
EYE PAIN: 0
SHORTNESS OF BREATH: 0
DIARRHEA: 0
HEMATEMESIS: 0
BACK PAIN: 0
ABDOMINAL PAIN: 1
CONSTIPATION: 0
VOMITING: 0
FACIAL SWELLING: 0
COUGH: 0

## 2025-01-30 ASSESSMENT — PAIN DESCRIPTION - LOCATION: LOCATION: PELVIS

## 2025-01-30 ASSESSMENT — PAIN SCALES - GENERAL: PAINLEVEL_OUTOF10: 6

## 2025-01-30 ASSESSMENT — LIFESTYLE VARIABLES
HOW OFTEN DO YOU HAVE A DRINK CONTAINING ALCOHOL: NEVER
HOW MANY STANDARD DRINKS CONTAINING ALCOHOL DO YOU HAVE ON A TYPICAL DAY: PATIENT DOES NOT DRINK

## 2025-01-30 ASSESSMENT — PAIN - FUNCTIONAL ASSESSMENT: PAIN_FUNCTIONAL_ASSESSMENT: 0-10

## 2025-01-30 ASSESSMENT — PAIN DESCRIPTION - DESCRIPTORS: DESCRIPTORS: ACHING

## 2025-01-30 ASSESSMENT — PAIN DESCRIPTION - ORIENTATION: ORIENTATION: ANTERIOR

## 2025-01-30 NOTE — ED TRIAGE NOTES
Patient to triage for complaint of pelvic pain that started about 1 pm. Patient reports constipation.

## 2025-01-31 NOTE — DISCHARGE INSTRUCTIONS
Eliquis is the anticoagulant.  Take 2 pills twice a day for 7 days then 1 pill twice a day after that.

## 2025-01-31 NOTE — ED PROVIDER NOTES
[facility] EMERGENCY DEPARTMENT  EMERGENCY DEPARTMENT ENCOUNTER      Pt Name: Yaneli Corrales  MRN: [id number]  Birthdate 1940  Date of evaluation: 1/30/2025  Provider: Stan Coy MD    CHIEF COMPLAINT       Chief Complaint   Patient presents with    Abdominal Pain         HISTORY OF PRESENT ILLNESS   (Location/Symptom, Timing/Onset, Context/Setting, Quality, Duration, Modifying Factors, Severity)  Note limiting factors.   84-year-old female with suprapubic pain started at 1:00 today which is 7 hours ago.  No hematuria or dysuria.  No flank pain and no fever.    The history is provided by the patient.   Abdominal Pain  Pain location:  Suprapubic  Pain quality: aching and cramping    Pain radiates to:  Does not radiate  Pain severity:  Mild  Onset quality:  Gradual  Duration:  6 hours  Timing:  Constant  Progression:  Worsening  Chronicity:  New  Context: not alcohol use, not awakening from sleep, not diet changes, not eating, not medication withdrawal, not previous surgeries, not recent illness and not recent sexual activity    Relieved by:  Nothing  Worsened by:  Nothing  Ineffective treatments:  None tried  Associated symptoms: no anorexia, no belching, no chest pain, no chills, no constipation, no cough, no diarrhea, no dysuria, no fatigue, no fever, no hematemesis, no hematuria, no melena, no nausea, no shortness of breath, no sore throat, no vaginal bleeding, no vaginal discharge and no vomiting          Review of External Medical Records:     Nursing Notes were reviewed.    REVIEW OF SYSTEMS    (2-9 systems for level 4, 10 or more for level 5)     Review of Systems   Constitutional:  Negative for activity change, appetite change, chills, diaphoresis, fatigue and fever.   HENT:  Negative for congestion, ear pain, facial swelling and sore throat.    Eyes:  Negative for pain, discharge and visual disturbance.   Respiratory:  Negative for cough, chest tightness and shortness of breath.

## 2025-01-31 NOTE — ED NOTES
Discharge instructions reviewed with patient. Discharge medications reviewed with the patient and appropriate educational materials and side effects teaching were provided. Opportunity for questions given. Pt stable and ambulatory at discharge.

## 2025-02-04 ENCOUNTER — HOSPITAL ENCOUNTER (EMERGENCY)
Facility: HOSPITAL | Age: 85
Discharge: HOME | End: 2025-02-04
Attending: EMERGENCY MEDICINE
Payer: MEDICARE

## 2025-02-04 ENCOUNTER — APPOINTMENT (OUTPATIENT)
Facility: HOSPITAL | Age: 85
End: 2025-02-04
Payer: MEDICARE

## 2025-02-04 VITALS
OXYGEN SATURATION: 93 % | DIASTOLIC BLOOD PRESSURE: 84 MMHG | HEART RATE: 79 BPM | BODY MASS INDEX: 26.68 KG/M2 | SYSTOLIC BLOOD PRESSURE: 147 MMHG | TEMPERATURE: 98.2 F | WEIGHT: 170 LBS | RESPIRATION RATE: 18 BRPM | HEIGHT: 67 IN

## 2025-02-04 DIAGNOSIS — S60.211A HEMATOMA OF RIGHT WRIST: Primary | ICD-10-CM

## 2025-02-04 PROCEDURE — 73110 X-RAY EXAM OF WRIST: CPT

## 2025-02-04 PROCEDURE — 99283 EMERGENCY DEPT VISIT LOW MDM: CPT

## 2025-02-04 ASSESSMENT — PAIN - FUNCTIONAL ASSESSMENT: PAIN_FUNCTIONAL_ASSESSMENT: NONE - DENIES PAIN

## 2025-02-04 NOTE — ED PROVIDER NOTES
[facility]  EMERGENCY DEPARTMENT ENCOUNTER      Pt Name: Yaneli Corrales  MRN: [id number]  Birthdate 1940  Date of evaluation: 2/4/2025  Provider: Partha Lowe MD      HISTORY OF PRESENT ILLNESS      84-year-old female with history of hypertension and depression presents to the emergency department chief complaint of right wrist pain.  She arrives by EMS.  She has recent visit with diagnosis of PE and started on blood thinners.  Today she presents with complaint of right wrist pain with bruising to the volar surface of the right wrist without trauma.    The history is provided by the patient and medical records.           Nursing Notes were reviewed.    REVIEW OF SYSTEMS         Review of Systems        PAST MEDICAL HISTORY     Past Medical History:   Diagnosis Date    Depression     HTN (hypertension)     Hyperlipidemia     Osteoporosis          SURGICAL HISTORY       Past Surgical History:   Procedure Laterality Date    BLADDER REPAIR      bladder tuck    HYSTERECTOMY (CERVIX STATUS UNKNOWN)      OTHER SURGICAL HISTORY      right wrist    TONSILLECTOMY           CURRENT MEDICATIONS       Previous Medications    APIXABAN (ELIQUIS) 5 MG TABS TABLET    Take 2 tablets by mouth 2 times daily for 7 days    APIXABAN (ELIQUIS) 5 MG TABS TABLET    Take 1 tablet by mouth 2 times daily    ASPIRIN 81 MG EC TABLET    Take by mouth daily    BREXPIPRAZOLE (REXULTI) 0.5 MG TABS TABLET    Take 1 tablet by mouth nightly    CALCIUM CITRATE-VITAMIN D (CITRACAL+D) 315-5 MG-MCG TABS PER TABLET    Patient held it back for hypercalcemia    DOCUSATE (COLACE, DULCOLAX) 100 MG CAPS    Take 100 mg by mouth daily    FLUVOXAMINE (LUVOX) 100 MG TABLET    Take 1 tablet by mouth    HYDROCHLOROTHIAZIDE (HYDRODIURIL) 25 MG TABLET    Take 1 tablet by mouth daily    LOSARTAN (COZAAR) 50 MG TABLET    Take 40 mg by mouth daily    METOPROLOL TARTRATE (LOPRESSOR) 50 MG TABLET    Take 1 tablet by mouth 2 times daily

## 2025-02-04 NOTE — ED TRIAGE NOTES
PT arrived by EMS ambulatory with complaints of R wrist/arm area swelling. PT denies pain reports that it feels tight. Pt denies injury.     PT was recently here for a blood clot in lung.

## 2025-03-14 ENCOUNTER — APPOINTMENT (OUTPATIENT)
Facility: HOSPITAL | Age: 85
End: 2025-03-14
Payer: MEDICARE

## 2025-03-14 ENCOUNTER — HOSPITAL ENCOUNTER (EMERGENCY)
Facility: HOSPITAL | Age: 85
Discharge: HOME | End: 2025-03-14
Attending: STUDENT IN AN ORGANIZED HEALTH CARE EDUCATION/TRAINING PROGRAM
Payer: MEDICARE

## 2025-03-14 VITALS
BODY MASS INDEX: 27.47 KG/M2 | TEMPERATURE: 97.5 F | WEIGHT: 175 LBS | OXYGEN SATURATION: 90 % | HEIGHT: 67 IN | RESPIRATION RATE: 21 BRPM | HEART RATE: 77 BPM | SYSTOLIC BLOOD PRESSURE: 150 MMHG | DIASTOLIC BLOOD PRESSURE: 96 MMHG

## 2025-03-14 DIAGNOSIS — R42 DIZZINESS: ICD-10-CM

## 2025-03-14 DIAGNOSIS — H81.10 BENIGN PAROXYSMAL POSITIONAL VERTIGO, UNSPECIFIED LATERALITY: Primary | ICD-10-CM

## 2025-03-14 LAB
ALBUMIN SERPL-MCNC: 3.9 G/DL (ref 3.5–5.2)
ALBUMIN/GLOB SERPL: 1.5 (ref 1.1–2.2)
ALP SERPL-CCNC: 83 U/L (ref 35–104)
ALT SERPL-CCNC: 17 U/L (ref 10–35)
ANION GAP SERPL CALC-SCNC: 12 MMOL/L (ref 2–12)
APPEARANCE UR: CLEAR
AST SERPL-CCNC: 20 U/L (ref 10–35)
BACTERIA URNS QL MICRO: NEGATIVE /HPF
BASOPHILS # BLD: 0.08 K/UL (ref 0–0.1)
BASOPHILS NFR BLD: 1.3 % (ref 0–1)
BILIRUB SERPL-MCNC: 0.4 MG/DL (ref 0.2–1)
BILIRUB UR QL: NEGATIVE
BUN SERPL-MCNC: 20 MG/DL (ref 8–23)
BUN/CREAT SERPL: 24 (ref 12–20)
CALCIUM SERPL-MCNC: 9.3 MG/DL (ref 8.8–10.2)
CHLORIDE SERPL-SCNC: 103 MMOL/L (ref 98–107)
CO2 SERPL-SCNC: 26 MMOL/L (ref 22–29)
COLOR UR: ABNORMAL
COMMENT:: NORMAL
CREAT SERPL-MCNC: 0.83 MG/DL (ref 0.5–0.9)
DIFFERENTIAL METHOD BLD: ABNORMAL
EOSINOPHIL # BLD: 0.19 K/UL (ref 0–0.4)
EOSINOPHIL NFR BLD: 3.2 % (ref 0–7)
EPITH CASTS URNS QL MICRO: ABNORMAL /LPF
ERYTHROCYTE [DISTWIDTH] IN BLOOD BY AUTOMATED COUNT: 13.2 % (ref 11.5–14.5)
GLOBULIN SER CALC-MCNC: 2.6 G/DL (ref 2–4)
GLUCOSE SERPL-MCNC: 139 MG/DL (ref 65–100)
GLUCOSE UR STRIP.AUTO-MCNC: NEGATIVE MG/DL
HCT VFR BLD AUTO: 38.7 % (ref 35–47)
HGB BLD-MCNC: 12.9 G/DL (ref 11.5–16)
HGB UR QL STRIP: ABNORMAL
IMM GRANULOCYTES # BLD AUTO: 0.02 K/UL (ref 0–0.04)
IMM GRANULOCYTES NFR BLD AUTO: 0.3 % (ref 0–0.5)
KETONES UR QL STRIP.AUTO: NEGATIVE MG/DL
LEUKOCYTE ESTERASE UR QL STRIP.AUTO: ABNORMAL
LYMPHOCYTES # BLD: 0.9 K/UL (ref 0.8–3.5)
LYMPHOCYTES NFR BLD: 15.1 % (ref 12–49)
MCH RBC QN AUTO: 30.9 PG (ref 26–34)
MCHC RBC AUTO-ENTMCNC: 33.3 G/DL (ref 30–36.5)
MCV RBC AUTO: 92.6 FL (ref 80–99)
MONOCYTES # BLD: 0.45 K/UL (ref 0–1)
MONOCYTES NFR BLD: 7.6 % (ref 5–13)
NEUTS SEG # BLD: 4.32 K/UL (ref 1.8–8)
NEUTS SEG NFR BLD: 72.5 % (ref 32–75)
NITRITE UR QL STRIP.AUTO: NEGATIVE
NRBC # BLD: 0 K/UL (ref 0–0.01)
NRBC BLD-RTO: 0 PER 100 WBC
PH UR STRIP: 6 (ref 5–8)
PLATELET # BLD AUTO: 270 K/UL (ref 150–400)
PMV BLD AUTO: 9.2 FL (ref 8.9–12.9)
POTASSIUM SERPL-SCNC: 3.7 MMOL/L (ref 3.5–5.1)
PROT SERPL-MCNC: 6.5 G/DL (ref 6.4–8.3)
PROT UR STRIP-MCNC: NEGATIVE MG/DL
RBC # BLD AUTO: 4.18 M/UL (ref 3.8–5.2)
RBC #/AREA URNS HPF: ABNORMAL /HPF
SODIUM SERPL-SCNC: 141 MMOL/L (ref 136–145)
SP GR UR REFRACTOMETRY: 1.01 (ref 1–1.03)
SPECIMEN HOLD: NORMAL
SPECIMEN HOLD: NORMAL
TROPONIN T SERPL HS-MCNC: 30.1 NG/L (ref 0–14)
TROPONIN T SERPL HS-MCNC: 33.2 NG/L (ref 0–14)
UROBILINOGEN UR QL STRIP.AUTO: 0.2 EU/DL (ref 0.2–1)
WBC # BLD AUTO: 6 K/UL (ref 3.6–11)
WBC URNS QL MICRO: ABNORMAL /HPF (ref 0–4)

## 2025-03-14 PROCEDURE — 70498 CT ANGIOGRAPHY NECK: CPT

## 2025-03-14 PROCEDURE — 84484 ASSAY OF TROPONIN QUANT: CPT

## 2025-03-14 PROCEDURE — 85025 COMPLETE CBC W/AUTO DIFF WBC: CPT

## 2025-03-14 PROCEDURE — 99285 EMERGENCY DEPT VISIT HI MDM: CPT

## 2025-03-14 PROCEDURE — 6360000004 HC RX CONTRAST MEDICATION: Performed by: STUDENT IN AN ORGANIZED HEALTH CARE EDUCATION/TRAINING PROGRAM

## 2025-03-14 PROCEDURE — 36415 COLL VENOUS BLD VENIPUNCTURE: CPT

## 2025-03-14 PROCEDURE — 81001 URINALYSIS AUTO W/SCOPE: CPT

## 2025-03-14 PROCEDURE — 93005 ELECTROCARDIOGRAM TRACING: CPT | Performed by: STUDENT IN AN ORGANIZED HEALTH CARE EDUCATION/TRAINING PROGRAM

## 2025-03-14 PROCEDURE — 80053 COMPREHEN METABOLIC PANEL: CPT

## 2025-03-14 PROCEDURE — 70450 CT HEAD/BRAIN W/O DYE: CPT

## 2025-03-14 RX ORDER — IOPAMIDOL 755 MG/ML
100 INJECTION, SOLUTION INTRAVASCULAR
Status: COMPLETED | OUTPATIENT
Start: 2025-03-14 | End: 2025-03-14

## 2025-03-14 RX ORDER — MECLIZINE HYDROCHLORIDE 25 MG/1
25 TABLET ORAL 3 TIMES DAILY PRN
Qty: 30 TABLET | Refills: 0 | Status: SHIPPED | OUTPATIENT
Start: 2025-03-14 | End: 2025-03-24

## 2025-03-14 RX ADMIN — IOPAMIDOL 100 ML: 755 INJECTION, SOLUTION INTRAVENOUS at 17:16

## 2025-03-14 ASSESSMENT — ENCOUNTER SYMPTOMS
SHORTNESS OF BREATH: 0
VOMITING: 0
SORE THROAT: 0
ABDOMINAL PAIN: 0
EYE PAIN: 0
NAUSEA: 0
COUGH: 0
DIARRHEA: 0

## 2025-03-14 NOTE — ED PROVIDER NOTES
voice recognition program.  Efforts were made to edit the dictations but occasionally words are mis-transcribed.)    GI CROSS (electronically signed)  Emergency Attending Physician / Physician Assistant / Nurse Practitioner              Karuna Lofton PA  03/14/25 1954

## 2025-03-14 NOTE — ED TRIAGE NOTES
Pt states she was sent ER by her PCP for increased dizziness. She states she has been on eliquis for a blood clot in her lung.   Pt denies any SOB or pain.   Patient arrives to ED ambulatory w/o difficulty. No acute distress noted in triage. A&O x 4. Skin is warm, dry & intact on obs.

## 2025-03-15 LAB
EKG ATRIAL RATE: 87 BPM
EKG DIAGNOSIS: NORMAL
EKG P AXIS: 30 DEGREES
EKG P-R INTERVAL: 176 MS
EKG Q-T INTERVAL: 400 MS
EKG QRS DURATION: 156 MS
EKG QTC CALCULATION (BAZETT): 481 MS
EKG R AXIS: -53 DEGREES
EKG T AXIS: 78 DEGREES
EKG VENTRICULAR RATE: 87 BPM

## 2025-03-15 PROCEDURE — 93010 ELECTROCARDIOGRAM REPORT: CPT | Performed by: SPECIALIST

## 2025-05-30 ENCOUNTER — HOSPITAL ENCOUNTER (EMERGENCY)
Facility: HOSPITAL | Age: 85
Discharge: HOME | End: 2025-05-30
Attending: STUDENT IN AN ORGANIZED HEALTH CARE EDUCATION/TRAINING PROGRAM
Payer: MEDICARE

## 2025-05-30 ENCOUNTER — APPOINTMENT (OUTPATIENT)
Facility: HOSPITAL | Age: 85
End: 2025-05-30
Payer: MEDICARE

## 2025-05-30 VITALS
BODY MASS INDEX: 27.47 KG/M2 | HEART RATE: 81 BPM | OXYGEN SATURATION: 94 % | SYSTOLIC BLOOD PRESSURE: 92 MMHG | HEIGHT: 67 IN | WEIGHT: 175 LBS | TEMPERATURE: 97.7 F | DIASTOLIC BLOOD PRESSURE: 69 MMHG | RESPIRATION RATE: 17 BRPM

## 2025-05-30 DIAGNOSIS — S92.415A CLOSED NONDISPLACED FRACTURE OF PROXIMAL PHALANX OF LEFT GREAT TOE, INITIAL ENCOUNTER: Primary | ICD-10-CM

## 2025-05-30 PROCEDURE — 73660 X-RAY EXAM OF TOE(S): CPT

## 2025-05-30 PROCEDURE — 6370000000 HC RX 637 (ALT 250 FOR IP): Performed by: STUDENT IN AN ORGANIZED HEALTH CARE EDUCATION/TRAINING PROGRAM

## 2025-05-30 PROCEDURE — 99283 EMERGENCY DEPT VISIT LOW MDM: CPT

## 2025-05-30 RX ORDER — ACETAMINOPHEN 325 MG/1
650 TABLET ORAL
Status: COMPLETED | OUTPATIENT
Start: 2025-05-30 | End: 2025-05-30

## 2025-05-30 RX ADMIN — ACETAMINOPHEN 650 MG: 325 TABLET ORAL at 12:43

## 2025-05-30 ASSESSMENT — PAIN - FUNCTIONAL ASSESSMENT
PAIN_FUNCTIONAL_ASSESSMENT: 0-10
PAIN_FUNCTIONAL_ASSESSMENT: INTOLERABLE, UNABLE TO DO ANY ACTIVE OR PASSIVE ACTIVITIES

## 2025-05-30 ASSESSMENT — PAIN DESCRIPTION - DESCRIPTORS: DESCRIPTORS: ACHING

## 2025-05-30 ASSESSMENT — PAIN SCALES - GENERAL
PAINLEVEL_OUTOF10: 9
PAINLEVEL_OUTOF10: 9

## 2025-05-30 ASSESSMENT — PAIN DESCRIPTION - LOCATION: LOCATION: TOE (COMMENT WHICH ONE)

## 2025-05-30 ASSESSMENT — PAIN DESCRIPTION - ORIENTATION: ORIENTATION: LEFT

## 2025-05-30 NOTE — ED TRIAGE NOTES
Pt reports to ED w/ cc of GLF that occurred yesterday, states she was in the bathtub and fell getting out. Denies dizziness/LOC. Did not hit her head. States she more than likely tripped on the lip of the tub. Does take eliquis.     Endorses left big toe pain

## 2025-05-30 NOTE — ED NOTES
Pt provided discharge instructions, prescriptions, education and follow up information. Pt verbalizing understanding. Pt A&Ox4,breathing unlabored on RA. Pain controlled. Patient ambulatory out of ER.     Patient educated on use of post-op shoe, jacinto taping toe, and wound care. Verbalizing understanding. Patient ambulating without difficulty.

## 2025-05-30 NOTE — ED PROVIDER NOTES
[facility]  EMERGENCY DEPARTMENT ENCOUNTER      Pt Name: Yaneli Corrales  MRN: [id number]  Birthdate 1940  Date of evaluation: 5/30/2025  Provider: Hayden Bonilla MD    CHIEF COMPLAINT       Chief Complaint   Patient presents with    Toe Pain    Fall       HISTORY OF PRESENT ILLNESS    HPI    85-year-old female on Eliquis here for left toe pain.  Tripped getting out of the tub last evening.  She is on 100% sure she did not hit her head or lose consciousness.  She complains only of left toe pain.     Nursing notes reviewed.    REVIEW OF SYSTEMS     Review of Systems  Unless otherwise stated, a complete review of systems was asked of the patient. Pertinent positives are noted in the HPI section.    PAST MEDICAL HISTORY     Past Medical History:   Diagnosis Date    Depression     HTN (hypertension)     Hyperlipidemia     Osteoporosis        SURGICAL HISTORY       Past Surgical History:   Procedure Laterality Date    BLADDER REPAIR      bladder tuck    HYSTERECTOMY (CERVIX STATUS UNKNOWN)      OTHER SURGICAL HISTORY      right wrist    TONSILLECTOMY         CURRENT MEDICATIONS       Discharge Medication List as of 5/30/2025  1:04 PM        CONTINUE these medications which have NOT CHANGED    Details   brexpiprazole (REXULTI) 0.5 MG TABS tablet Take 1 tablet by mouth nightlyHistorical Med      apixaban (ELIQUIS) 5 MG TABS tablet Take 1 tablet by mouth 2 times daily, Disp-180 tablet, R-0Normal      !! QUEtiapine (SEROQUEL) 300 MG tablet Take 1 tablet by mouth nightly, Disp-60 tablet, R-3Normal      metoprolol tartrate (LOPRESSOR) 50 MG tablet Take 1 tablet by mouth 2 times daily, Disp-60 tablet, R-3Normal      potassium chloride (KLOR-CON) 20 MEQ packet Take 20 mEq by mouth daily, Disp-30 each, R-3Normal      !! QUEtiapine (SEROQUEL) 300 MG tablet Take 1 tablet by mouth nightly at bedtime.Historical Med      aspirin 81 MG EC tablet Take by mouth dailyHistorical Med      calcium  citrate-vitamin D (CITRACAL+D) 315-5 MG-MCG TABS per tablet Patient held it back for hypercalcemiaHistorical Med      docusate (COLACE, DULCOLAX) 100 MG CAPS Take 100 mg by mouth dailyHistorical Med      fluvoxaMINE (LUVOX) 100 MG tablet Take 1 tablet by mouthHistorical Med      hydroCHLOROthiazide (HYDRODIURIL) 25 MG tablet Take 1 tablet by mouth dailyHistorical Med      losartan (COZAAR) 50 MG tablet Take 40 mg by mouth dailyHistorical Med       !! - Potential duplicate medications found. Please discuss with provider.          ALLERGIES     Statins    FAMILY HISTORY       Family History   Problem Relation Age of Onset    Alcohol Abuse Father     Cancer Mother         lung    Hypertension Sister         SOCIAL HISTORY       Social History     Socioeconomic History    Marital status: Single     Spouse name: None    Number of children: None    Years of education: None    Highest education level: None   Tobacco Use    Smoking status: Former     Current packs/day: 0.00     Types: Cigarettes     Quit date: 1973     Years since quittin.4    Smokeless tobacco: Never   Vaping Use    Vaping status: Never Used   Substance and Sexual Activity    Alcohol use: No    Drug use: Never    Sexual activity: Not Currently     Birth control/protection: Surgical     Comment: HYST       PHYSICAL EXAM       ED Triage Vitals [25 1204]   BP Systolic BP Percentile Diastolic BP Percentile Temp Temp Source Pulse Respirations SpO2   92/69 -- -- 97.7 °F (36.5 °C) Oral 81 17 94 %      Height Weight - Scale         1.702 m (5' 7\") 79.4 kg (175 lb)           Physical Exam  Constitutional:       Appearance: Normal appearance.   HENT:      Head: Normocephalic and atraumatic.      Nose: Nose normal.      Mouth/Throat:      Mouth: Mucous membranes are moist.   Eyes:      General: No scleral icterus.     Extraocular Movements: Extraocular movements intact.   Cardiovascular:      Rate and Rhythm: Normal rate.      Pulses: Normal pulses.

## 2025-06-01 ENCOUNTER — TRANSCRIBE ORDERS (OUTPATIENT)
Facility: HOSPITAL | Age: 85
End: 2025-06-01

## 2025-06-01 DIAGNOSIS — I26.99 PULMONARY INFARCTION (HCC): Primary | ICD-10-CM
